# Patient Record
Sex: MALE | Race: BLACK OR AFRICAN AMERICAN | Employment: FULL TIME | URBAN - METROPOLITAN AREA
[De-identification: names, ages, dates, MRNs, and addresses within clinical notes are randomized per-mention and may not be internally consistent; named-entity substitution may affect disease eponyms.]

---

## 2020-08-03 VITALS
HEIGHT: 65 IN | TEMPERATURE: 97.8 F | SYSTOLIC BLOOD PRESSURE: 130 MMHG | DIASTOLIC BLOOD PRESSURE: 82 MMHG | BODY MASS INDEX: 29.82 KG/M2 | WEIGHT: 179 LBS

## 2020-08-03 PROBLEM — N40.1 ENLARGED PROSTATE WITH URINARY OBSTRUCTION: Status: ACTIVE | Noted: 2020-08-03

## 2020-08-03 PROBLEM — N49.1 VASITIS: Status: ACTIVE | Noted: 2020-08-03

## 2020-08-03 PROBLEM — R35.1 NOCTURIA: Status: ACTIVE | Noted: 2020-08-03

## 2020-08-03 PROBLEM — Z80.42 FAMILY HISTORY OF PROSTATE CANCER: Status: ACTIVE | Noted: 2020-08-03

## 2020-08-03 PROBLEM — R35.0 INCREASED URINARY FREQUENCY: Status: ACTIVE | Noted: 2020-08-03

## 2020-08-03 PROBLEM — N13.8 ENLARGED PROSTATE WITH URINARY OBSTRUCTION: Status: ACTIVE | Noted: 2020-08-03

## 2020-08-03 PROBLEM — N41.1 CHRONIC PROSTATITIS: Status: ACTIVE | Noted: 2020-08-03

## 2020-08-03 RX ORDER — SILDENAFIL 100 MG/1
100 TABLET, FILM COATED ORAL AS NEEDED
COMMUNITY

## 2020-08-03 RX ORDER — VALACYCLOVIR HYDROCHLORIDE 500 MG/1
TABLET, FILM COATED ORAL
COMMUNITY

## 2020-08-03 RX ORDER — HYDROCHLOROTHIAZIDE 25 MG/1
25 TABLET ORAL DAILY
COMMUNITY
End: 2021-01-05 | Stop reason: ALTCHOICE

## 2020-08-03 RX ORDER — ASPIRIN 81 MG/1
TABLET ORAL DAILY
COMMUNITY

## 2020-08-03 RX ORDER — TELMISARTAN 80 MG/1
80 TABLET ORAL DAILY
COMMUNITY

## 2020-08-03 RX ORDER — AMLODIPINE BESYLATE 2.5 MG/1
TABLET ORAL DAILY
COMMUNITY

## 2020-08-03 RX ORDER — ATORVASTATIN CALCIUM 10 MG/1
TABLET, FILM COATED ORAL DAILY
COMMUNITY

## 2020-08-03 RX ORDER — MELATONIN
DAILY
COMMUNITY

## 2020-08-03 RX ORDER — GLUCOSAM/CHONDRO/HERB 149/HYAL 750-100 MG
1 TABLET ORAL DAILY
COMMUNITY

## 2020-08-20 ENCOUNTER — OFFICE VISIT (OUTPATIENT)
Dept: UROLOGY | Age: 55
End: 2020-08-20
Payer: OTHER GOVERNMENT

## 2020-08-20 VITALS
TEMPERATURE: 97.9 F | DIASTOLIC BLOOD PRESSURE: 82 MMHG | BODY MASS INDEX: 30.49 KG/M2 | WEIGHT: 183 LBS | HEIGHT: 65 IN | SYSTOLIC BLOOD PRESSURE: 136 MMHG

## 2020-08-20 DIAGNOSIS — R35.1 NOCTURIA: ICD-10-CM

## 2020-08-20 DIAGNOSIS — N49.1 VASITIS: ICD-10-CM

## 2020-08-20 DIAGNOSIS — N13.8 ENLARGED PROSTATE WITH URINARY OBSTRUCTION: ICD-10-CM

## 2020-08-20 DIAGNOSIS — N41.1 CHRONIC PROSTATITIS: ICD-10-CM

## 2020-08-20 DIAGNOSIS — R35.0 INCREASED URINARY FREQUENCY: Primary | ICD-10-CM

## 2020-08-20 DIAGNOSIS — N40.1 ENLARGED PROSTATE WITH URINARY OBSTRUCTION: ICD-10-CM

## 2020-08-20 LAB
BILIRUB UR QL STRIP: NEGATIVE
GLUCOSE UR-MCNC: NEGATIVE MG/DL
KETONES P FAST UR STRIP-MCNC: NEGATIVE MG/DL
PH UR STRIP: 6 [PH] (ref 4.6–8)
PROT UR QL STRIP: NEGATIVE
SP GR UR STRIP: 1.02 (ref 1–1.03)
UA UROBILINOGEN AMB POC: NORMAL (ref 0.2–1)
URINALYSIS CLARITY POC: CLEAR
URINALYSIS COLOR POC: YELLOW
URINE BLOOD POC: NEGATIVE
URINE LEUKOCYTES POC: NEGATIVE
URINE NITRITES POC: NEGATIVE

## 2020-08-20 PROCEDURE — 99214 OFFICE O/P EST MOD 30 MIN: CPT | Performed by: UROLOGY

## 2020-08-20 PROCEDURE — 81003 URINALYSIS AUTO W/O SCOPE: CPT | Performed by: UROLOGY

## 2020-08-20 RX ORDER — SERTRALINE HYDROCHLORIDE 100 MG/1
TABLET, FILM COATED ORAL DAILY
COMMUNITY
End: 2021-02-24

## 2020-08-20 NOTE — PROGRESS NOTES
HPI ROS PE NOTE          History of Present Illness   Chief complaint: Follow-up for prostatitis, epididymitis and vasitis  Kim Plascencia is a 62 y.o. male who presents with his history of acute epididymitis and vasitis treated in July 2019 with doxycycline 100 mg twice daily for approximately 8 weeks. Patient was seen in follow-up in February of this year having resolved his symptoms, PSA test was done in December 2019 0.38. He has BPH with moderate bladder neck obstruction symptoms. International prostate symptom score a total of 4: nocturia once per night, see less than half the time, urgency less than 1 time in 5 voids. The patient's father had prostate cancer and therefore he is at higher risk for genetic transfer of this condition and needs to be carefully followed. Past Medical History:   Diagnosis Date    Burning with urination     Depression     Hypercholesterolemia     Hypertension     Long term current use of anticoagulant therapy     asa 81mg daily    Stroke [facility])       Past Surgical History:   Procedure Laterality Date    HX APPENDECTOMY       Family History   Problem Relation Age of Onset    Cancer Father         prostate      Social History     Tobacco Use    Smoking status: Never Smoker    Smokeless tobacco: Never Used   Substance Use Topics    Alcohol use: Never     Frequency: Never       Prior to Admission medications    Medication Sig Start Date End Date Taking? Authorizing Provider   sertraline (Zoloft) 100 mg tablet Take  by mouth daily. Yes Provider, Historical   amLODIPine (NORVASC) 2.5 mg tablet Take  by mouth daily. Yes Provider, Historical   aspirin delayed-release 81 mg tablet Take  by mouth daily. Yes Provider, Historical   atorvastatin (LIPITOR) 10 mg tablet Take  by mouth daily. Yes Provider, Historical   cholecalciferol (VITAMIN D3) (5000 Units/125 mcg) tab tablet Take  by mouth daily.    Yes Provider, Historical   omega 3-DHA-EPA-fish oil 1,000 mg (120 mg-180 mg) capsule Take 1 Cap by mouth daily. Yes Provider, Historical   telmisartan (MICARDIS) 80 mg tablet Take 80 mg by mouth daily. Yes Provider, Historical   valACYclovir (VALTREX) 500 mg tablet Take  by mouth two (2) times a day. Yes Provider, Historical   sildenafil citrate (Viagra) 100 mg tablet Take 100 mg by mouth as needed for Erectile Dysfunction. Yes Provider, Historical   hydroCHLOROthiazide (HYDRODIURIL) 25 mg tablet Take 25 mg by mouth daily. Provider, Historical     No Known Allergies     Review of Systems:  Constitutional: negative  Respiratory: negative  Cardiovascular: negative  Genitourinary:positive for frequency, nocturia and urgency  Musculoskeletal:positive for arthralgias     Physical Exam             Physical Exam:   Visit Vitals  /82   Temp 97.9 °F (36.6 °C) (Oral)   Ht 5' 5\" (1.651 m)   Wt 183 lb (83 kg)   BMI 30.45 kg/m²     General appearance: alert, cooperative, no distress, appears stated age  Head: Normocephalic, without obvious abnormality, atraumatic  Heart: regular rate and rhythm, S1, S2 normal, no murmur, click, rub or gallop  Abdomen: soft, non-tender.  Bowel sounds normal. No masses,  no organomegaly  Male genitalia: normal  Rectal: refused exam  Extremities: extremities normal, atraumatic, no cyanosis or edema        Data Review:   Recent Results (from the past 48 hour(s))   AMB POC URINALYSIS DIP STICK AUTO W/O MICRO    Collection Time: 08/20/20 10:01 AM   Result Value Ref Range    Color (UA POC) Yellow     Clarity (UA POC) Clear     Glucose (UA POC) Negative Negative    Bilirubin (UA POC) Negative Negative    Ketones (UA POC) Negative Negative    Specific gravity (UA POC) 1.025 1.001 - 1.035    Blood (UA POC) Negative Negative    pH (UA POC) 6.0 4.6 - 8.0    Protein (UA POC) Negative Negative    Urobilinogen (UA POC) 0.2 mg/dL 0.2 - 1    Nitrites (UA POC) Negative Negative    Leukocyte esterase (UA POC) Negative Negative     No results for input(s): 48 in the last 72 hours. Assessment: history of epididymitis and vasitis, treated and resolved, and Plan:   BPH, mild obstructive symptoms, currently no treatment required  Family history of prostate cancer at increased risk for developing this disease, must follow carefully annually with PSA testing        Mr. Edison Colby has a reminder for a \"due or due soon\" health maintenance. I have asked that he contact his primary care provider for follow-up on this health maintenance. Marti Lozano M.D.  8/20/2020

## 2020-08-22 LAB — BACTERIA UR CULT: NO GROWTH

## 2020-12-28 ENCOUNTER — TRANSCRIBE ORDER (OUTPATIENT)
Dept: REGISTRATION | Age: 55
End: 2020-12-28

## 2020-12-28 ENCOUNTER — HOSPITAL ENCOUNTER (OUTPATIENT)
Dept: GENERAL RADIOLOGY | Age: 55
Discharge: HOME | End: 2020-12-28
Payer: OTHER GOVERNMENT

## 2020-12-28 DIAGNOSIS — M25.511 RIGHT SHOULDER PAIN: ICD-10-CM

## 2020-12-28 DIAGNOSIS — M25.511 RIGHT SHOULDER PAIN: Primary | ICD-10-CM

## 2020-12-28 PROCEDURE — 73030 X-RAY EXAM OF SHOULDER: CPT

## 2021-01-04 ENCOUNTER — TELEPHONE (OUTPATIENT)
Dept: UROLOGY | Age: 56
End: 2021-01-04

## 2021-01-05 ENCOUNTER — OFFICE VISIT (OUTPATIENT)
Dept: UROLOGY | Age: 56
End: 2021-01-05
Payer: OTHER GOVERNMENT

## 2021-01-05 VITALS
HEIGHT: 65 IN | BODY MASS INDEX: 30.82 KG/M2 | TEMPERATURE: 97.5 F | WEIGHT: 185 LBS | DIASTOLIC BLOOD PRESSURE: 62 MMHG | SYSTOLIC BLOOD PRESSURE: 128 MMHG

## 2021-01-05 DIAGNOSIS — R35.1 NOCTURIA: Primary | ICD-10-CM

## 2021-01-05 DIAGNOSIS — N13.8 ENLARGED PROSTATE WITH URINARY OBSTRUCTION: ICD-10-CM

## 2021-01-05 DIAGNOSIS — N40.1 ENLARGED PROSTATE WITH URINARY OBSTRUCTION: ICD-10-CM

## 2021-01-05 DIAGNOSIS — N49.1 VASITIS: ICD-10-CM

## 2021-01-05 DIAGNOSIS — R35.0 INCREASED URINARY FREQUENCY: ICD-10-CM

## 2021-01-05 PROCEDURE — 81003 URINALYSIS AUTO W/O SCOPE: CPT | Performed by: UROLOGY

## 2021-01-05 PROCEDURE — 99214 OFFICE O/P EST MOD 30 MIN: CPT | Performed by: UROLOGY

## 2021-01-05 RX ORDER — DOXYCYCLINE 100 MG/1
100 CAPSULE ORAL
Qty: 60 CAP | Refills: 3 | Status: SHIPPED | OUTPATIENT
Start: 2021-01-05 | End: 2021-02-04

## 2021-01-05 NOTE — PROGRESS NOTES
HPI ROS PE NOTE          History of Present Illness   Chief complaint: Follow-up for prostatitis epididymitis and vasitis: Family history of prostate cancer  Jaleesa Zhong is a 60 y.o. male who presents with follow-up for epididymitis and prostatitis treated in the summer 2019 for approximately 8 weeks on doxycycline patient was seen earlier this year with resolution of the symptoms after long-term therapy. Also has a family history of prostate cancer making him more vulnerable for development of this disease. PSA in December 2019 was 0.38. November 2020 report from [facility] clinic PSA of 0.37. Patient has bladder neck obstruction of a moderate severity, has nocturia once a night international prostate symptom score a total score of 8 including in addition frequency and weak stream less than half the time, incomplete emptying intermittency and urgency on occasion. Pleased with his voiding pattern. Does not take any active medications for his obstruction. Patient has had recent increase in urinary frequency and nocturia, may have exacerbation of prostatitis  Past Medical History:   Diagnosis Date    Burning with urination     Depression     Hypercholesterolemia     Hypertension     Long term current use of anticoagulant therapy     asa 81mg daily    Stroke [facility])       Past Surgical History:   Procedure Laterality Date    HX APPENDECTOMY       Family History   Problem Relation Age of Onset    Cancer Father         prostate      Social History     Tobacco Use    Smoking status: Never Smoker    Smokeless tobacco: Never Used   Substance Use Topics    Alcohol use: Never     Frequency: Never       Prior to Admission medications    Medication Sig Start Date End Date Taking? Authorizing Provider   doxycycline (VIBRAMYCIN) 100 mg capsule Take 1 Cap by mouth two (2) times daily (after meals) for 30 days.  1/5/21 2/4/21 Yes Chao Baker MD   sertraline (Zoloft) 100 mg tablet Take  by mouth daily. Yes Provider, Historical   amLODIPine (NORVASC) 2.5 mg tablet Take  by mouth daily. Yes Provider, Historical   aspirin delayed-release 81 mg tablet Take  by mouth daily. Yes Provider, Historical   atorvastatin (LIPITOR) 10 mg tablet Take  by mouth daily. Yes Provider, Historical   cholecalciferol (VITAMIN D3) (5000 Units/125 mcg) tab tablet Take  by mouth daily. Yes Provider, Historical   omega 3-DHA-EPA-fish oil 1,000 mg (120 mg-180 mg) capsule Take 1 Cap by mouth daily. Yes Provider, Historical   telmisartan (MICARDIS) 80 mg tablet Take 80 mg by mouth daily. Yes Provider, Historical   valACYclovir (VALTREX) 500 mg tablet Take  by mouth two (2) times a day. Yes Provider, Historical   sildenafil citrate (Viagra) 100 mg tablet Take 100 mg by mouth as needed for Erectile Dysfunction. Yes Provider, Historical     No Known Allergies     Review of Systems:  Constitutional: negative  Respiratory: negative  Cardiovascular: negative  Gastrointestinal: positive for constipation  Genitourinary:positive for frequency, nocturia, decreased stream and Urgency, urgency, incomplete bladder emptying  Musculoskeletal:negative  Neurological: negative     Physical Exam     Physical Exam:   Visit Vitals  /62 (BP 1 Location: Left arm, BP Patient Position: Sitting)   Temp 97.5 °F (36.4 °C) (Temporal)   Ht 5' 5\" (1.651 m)   Wt 185 lb (83.9 kg)   BMI 30.79 kg/m²     General appearance: alert, cooperative, no distress, appears stated age  Head: Normocephalic, without obvious abnormality, atraumatic  Lungs: clear to auscultation bilaterally  Chest wall: no tenderness  Heart: regular rate and rhythm, S1, S2 normal, no murmur, click, rub or gallop  Abdomen: soft, non-tender.  Bowel sounds normal. No masses,  no organomegaly  Male genitalia: normal  Rectal: Prostate 30 g benign  Extremities: extremities normal, atraumatic, no cyanosis or edema  Skin: Skin color, texture, turgor normal. No rashes or lesions  Neurologic: Grossly normal    Data Review:   Recent Results (from the past 48 hour(s))   AMB POC URINALYSIS DIP STICK AUTO W/O MICRO    Collection Time: 01/05/21  9:09 AM   Result Value Ref Range    Color (UA POC) Yellow     Clarity (UA POC) Clear     Glucose (UA POC) Negative Negative    Bilirubin (UA POC) Negative Negative    Ketones (UA POC) Negative Negative    Specific gravity (UA POC) 1.025 1.001 - 1.035    Blood (UA POC) Negative Negative    pH (UA POC) 6.0 4.6 - 8.0    Protein (UA POC) Negative Negative    Urobilinogen (UA POC) 1 mg/dL 0.2 - 1    Nitrites (UA POC) Negative Negative    Leukocyte esterase (UA POC) Negative Negative     No results for input(s): 48 in the last 72 hours. Assessment and Plan:    epididymitis, and vasitis, earlier present, treated, resolved; an increase in frequency and nocturia, possible exacerbation of prostatitis, restart doxycycline 100 mg twice daily for 1 month with follow-up in 6 weeks. Bladder neck obstruction secondary to BPH, nocturia, frequency, weak urinary stream, incomplete emptying, intermittency, urgency, all of mild to moderate extent and therefore patient does not require treatment for these at the present time,  Family history of prostate cancer, must continue to carefully follow with PSA studies once yearly. Return visit in 1 year sooner as needed with PSA before visit      Mr. Anastasiia Flores has a reminder for a \"due or due soon\" health maintenance. I have asked that he contact his primary care provider for follow-up on this health maintenance. Modesta Becerra M.D.  1/5/2021

## 2021-02-12 ENCOUNTER — TELEPHONE (OUTPATIENT)
Dept: UROLOGY | Age: 56
End: 2021-02-12

## 2021-02-15 ENCOUNTER — TELEPHONE (OUTPATIENT)
Dept: UROLOGY | Age: 56
End: 2021-02-15

## 2021-02-15 NOTE — TELEPHONE ENCOUNTER
Called pt to remind him of his appt tomorrow 02/16. Patient mentioned speaking with nurse in regards to his PSA. He said he is able to get a copy and bring it with him for his appt tomorrow. Patient made aware that he had physically given PSA results to one of the nurses 2 weeks ago approx. . I am unsure of what happened to that.

## 2021-02-16 ENCOUNTER — OFFICE VISIT (OUTPATIENT)
Dept: UROLOGY | Age: 56
End: 2021-02-16
Payer: OTHER GOVERNMENT

## 2021-02-16 VITALS
HEIGHT: 65 IN | TEMPERATURE: 97.6 F | HEART RATE: 69 BPM | BODY MASS INDEX: 29.82 KG/M2 | DIASTOLIC BLOOD PRESSURE: 90 MMHG | SYSTOLIC BLOOD PRESSURE: 135 MMHG | WEIGHT: 179 LBS

## 2021-02-16 DIAGNOSIS — Z80.42 FAMILY HX OF PROSTATE CANCER: Primary | ICD-10-CM

## 2021-02-16 DIAGNOSIS — N40.1 ENLARGED PROSTATE WITH URINARY OBSTRUCTION: ICD-10-CM

## 2021-02-16 DIAGNOSIS — R35.0 INCREASED URINARY FREQUENCY: ICD-10-CM

## 2021-02-16 DIAGNOSIS — N13.8 ENLARGED PROSTATE WITH URINARY OBSTRUCTION: ICD-10-CM

## 2021-02-16 LAB
BILIRUB UR QL STRIP: NEGATIVE
GLUCOSE UR-MCNC: NEGATIVE MG/DL
KETONES P FAST UR STRIP-MCNC: NEGATIVE MG/DL
PH UR STRIP: 7.5 [PH] (ref 4.6–8)
PROT UR QL STRIP: NORMAL
SP GR UR STRIP: 1.02 (ref 1–1.03)
UA UROBILINOGEN AMB POC: NORMAL (ref 0.2–1)
URINALYSIS CLARITY POC: CLEAR
URINALYSIS COLOR POC: YELLOW
URINE BLOOD POC: NEGATIVE
URINE LEUKOCYTES POC: NORMAL
URINE NITRITES POC: NEGATIVE

## 2021-02-16 PROCEDURE — 81003 URINALYSIS AUTO W/O SCOPE: CPT | Performed by: UROLOGY

## 2021-02-16 PROCEDURE — 99213 OFFICE O/P EST LOW 20 MIN: CPT | Performed by: UROLOGY

## 2021-02-16 NOTE — PROGRESS NOTES
HPI ROS PE NOTE          History of Present Illness   Chief complaint: Follow-up for prostatitis epididymitis and family history of prostate cancer  Thuy Helm is a 60 y.o. male who presents with follow-up visit from January 5, 2021, treated with doxycycline 100 mg twice daily, increased frequency thought to be representative of recurrent prostatitis. The background of this situation is at an earlier visit in the summer 2019 when the patient had prostatitis epididymitis and vasitis and was successfully treated with doxycycline at that time. His international prostate symptom score at the time of his last visit was a total of 8 whereas today's interview which I conducted showed an international prostate symptom score of 4 reflecting frequency about half the time and nocturia once per night but otherwise no complaints. The patient said that he has had a recent PSA test but the only one in the record that we have which was just printed yesterday was a result from December 2019. My previous note indicates that in November 2020 PSA value was 0.37 although I do not have an independent laboratory report. .. Past Medical History:   Diagnosis Date    Burning with urination     Depression     Hypercholesterolemia     Hypertension     Long term current use of anticoagulant therapy     asa 81mg daily    Stroke [facility])       Past Surgical History:   Procedure Laterality Date    HX APPENDECTOMY       Family History   Problem Relation Age of Onset    Cancer Father         prostate      Social History     Tobacco Use    Smoking status: Never Smoker    Smokeless tobacco: Never Used   Substance Use Topics    Alcohol use: Never     Frequency: Never       Prior to Admission medications    Medication Sig Start Date End Date Taking? Authorizing Provider   sertraline (Zoloft) 100 mg tablet Take  by mouth daily. Yes Provider, Historical   amLODIPine (NORVASC) 2.5 mg tablet Take  by mouth daily.    Yes Provider, Historical   aspirin delayed-release 81 mg tablet Take  by mouth daily. Yes Provider, Historical   atorvastatin (LIPITOR) 10 mg tablet Take  by mouth daily. Yes Provider, Historical   cholecalciferol (VITAMIN D3) (5000 Units/125 mcg) tab tablet Take  by mouth daily. Yes Provider, Historical   omega 3-DHA-EPA-fish oil 1,000 mg (120 mg-180 mg) capsule Take 1 Cap by mouth daily. Yes Provider, Historical   telmisartan (MICARDIS) 80 mg tablet Take 80 mg by mouth daily. Yes Provider, Historical   valACYclovir (VALTREX) 500 mg tablet Take  by mouth two (2) times a day. Yes Provider, Historical   sildenafil citrate (Viagra) 100 mg tablet Take 100 mg by mouth as needed for Erectile Dysfunction. Yes Provider, Historical     No Known Allergies     Review of Systems:  A comprehensive review of systems was negative except for that written in the History of Present Illness. Physical Exam     Physical Exam:   Visit Vitals  BP (!) 135/90 (BP 1 Location: Left arm, BP Patient Position: Sitting, BP Cuff Size: Adult long)   Pulse 69   Temp 97.6 °F (36.4 °C) (Temporal)   Ht 5' 5\" (1.651 m)   Wt 179 lb (81.2 kg)   BMI 29.79 kg/m²     General appearance: alert, cooperative, no distress, appears stated age  Head: Normocephalic, without obvious abnormality, atraumatic  Nose: Nares normal. Septum midline. Mucosa normal. No drainage or sinus tenderness. Back: symmetric, no curvature. ROM normal. No CVA tenderness. Lungs: clear to auscultation bilaterally  Chest wall: no tenderness  Heart: regular rate and rhythm, S1, S2 normal, no murmur, click, rub or gallop  Abdomen: soft, non-tender.  Bowel sounds normal. No masses,  no organomegaly  Male genitalia: normal  Rectal: Prostate 30 g and benign  Extremities: extremities normal, atraumatic, no cyanosis or edema  Pulses: 2+ and symmetric  Skin: Skin color, texture, turgor normal. No rashes or lesions    Data Review:   Recent Results (from the past 48 hour(s))   AMB POC URINALYSIS DIP STICK AUTO W/O MICRO    Collection Time: 02/16/21 11:17 AM   Result Value Ref Range    Color (UA POC) Yellow     Clarity (UA POC) Clear     Glucose (UA POC) Negative Negative    Bilirubin (UA POC) Negative Negative    Ketones (UA POC) Negative Negative    Specific gravity (UA POC) 1.025 1.001 - 1.035    Blood (UA POC) Negative Negative    pH (UA POC) 7.5 4.6 - 8.0    Protein (UA POC) 1+ Negative    Urobilinogen (UA POC) 0.2 mg/dL 0.2 - 1    Nitrites (UA POC) Negative Negative    Leukocyte esterase (UA POC) Trace Negative     No results for input(s): 48 in the last 72 hours. Assessment and Plan:   History of epididymitis and vasitis, resolved  Prostatitis with associated BPH, effective treatment with doxycycline 100 mg twice daily, recommend to continue this medication at 100 mg daily reduced from twice daily  Family history of prostate cancer continue to carefully follow with PSA, next visit 6 months with PSA before visit  Proteinuria, continue to follow    Mr. Dhaval Chan has a reminder for a \"due or due soon\" health maintenance. I have asked that he contact his primary care provider for follow-up on this health maintenance. Marco Antonio Daugherty M.D.  2/16/2021

## 2021-02-18 LAB — BACTERIA UR CULT: NO GROWTH

## 2021-02-24 ENCOUNTER — HOSPITAL ENCOUNTER (OUTPATIENT)
Dept: PREADMISSION TESTING | Age: 56
Discharge: HOME | End: 2021-02-24
Payer: OTHER GOVERNMENT

## 2021-02-24 VITALS
HEIGHT: 65 IN | BODY MASS INDEX: 30.96 KG/M2 | SYSTOLIC BLOOD PRESSURE: 150 MMHG | DIASTOLIC BLOOD PRESSURE: 84 MMHG | TEMPERATURE: 98.7 F | OXYGEN SATURATION: 99 % | WEIGHT: 185.85 LBS | RESPIRATION RATE: 14 BRPM | HEART RATE: 93 BPM

## 2021-02-24 LAB
ANION GAP SERPL CALC-SCNC: 2 MMOL/L (ref 5–15)
ATRIAL RATE: 67 BPM
BUN SERPL-MCNC: 17 MG/DL (ref 6–20)
BUN/CREAT SERPL: 14 (ref 12–20)
CALCIUM SERPL-MCNC: 9.5 MG/DL (ref 8.5–10.1)
CALCULATED P AXIS, ECG09: 62 DEGREES
CALCULATED R AXIS, ECG10: -17 DEGREES
CALCULATED T AXIS, ECG11: 31 DEGREES
CHLORIDE SERPL-SCNC: 108 MMOL/L (ref 97–108)
CO2 SERPL-SCNC: 32 MMOL/L (ref 21–32)
CREAT SERPL-MCNC: 1.25 MG/DL (ref 0.7–1.3)
DIAGNOSIS, 93000: NORMAL
GLUCOSE SERPL-MCNC: 86 MG/DL (ref 65–100)
P-R INTERVAL, ECG05: 168 MS
POTASSIUM SERPL-SCNC: 4.6 MMOL/L (ref 3.5–5.1)
Q-T INTERVAL, ECG07: 366 MS
QRS DURATION, ECG06: 100 MS
QTC CALCULATION (BEZET), ECG08: 386 MS
SODIUM SERPL-SCNC: 142 MMOL/L (ref 136–145)
VENTRICULAR RATE, ECG03: 67 BPM

## 2021-02-24 PROCEDURE — 93005 ELECTROCARDIOGRAM TRACING: CPT

## 2021-02-24 PROCEDURE — 36415 COLL VENOUS BLD VENIPUNCTURE: CPT

## 2021-02-24 PROCEDURE — 80048 BASIC METABOLIC PNL TOTAL CA: CPT

## 2021-02-24 RX ORDER — TRAZODONE HYDROCHLORIDE 50 MG/1
TABLET ORAL
COMMUNITY

## 2021-02-24 RX ORDER — BUPROPION HYDROCHLORIDE 150 MG/1
150 TABLET ORAL
COMMUNITY

## 2021-02-24 RX ORDER — PRAZOSIN HYDROCHLORIDE 5 MG/1
CAPSULE ORAL
COMMUNITY

## 2021-02-24 NOTE — PERIOP NOTES
It is now mandated that all surgical patients be tested for COVID-19 exactly 4 days prior to surgery. Your COVID test date is Monday, 3/8/21, 8am-11am.      You must bring a photo ID. COVID testing will be performed curbside in the [address] [facility] [address]. There are signs leading you to the testing site. Please self-quarantine at home after testing and prior to your surgery date. You will only be notified if your results are positive. For Your Information:    · Coronavirus (COVID-19) affects different people in different ways  · It also appears with a wide range of symptoms from mild to severe  · Signs appear 2-14 days after exposure     · If you develop any of the following, notify your doctor immediately:  ? Fever  ? Chills, with or without a shiver  ? Muscle pain  ? Headache  ? Sore throat  ? Dry cough  ? New loss of taste or smell  ? Tiredness     ·  If you develop any of the following, call 911:  ? Shortness of breath  ? Difficulty breathing  ? Chest pain  ? New confusion  ?  Blueness of fingers and/or lips

## 2021-02-24 NOTE — H&P
History and Physical    Patient: Chris Sesay MRN: [id number]  SSN: [id number]    YOB: 1965  Age: 60 y.o. Sex: male      Subjective:      Chris Sesay is a 60 y.o. male who notes he has chronic right shoulder pain. He is retired  and has been hurting for over 10 years. He is an instructor now at [employer] and notes even lifting his arm to use his clicker is painful. Denies numbness/tingling. He is RHD. He had a previous CVA with unknown timeline. 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 He notes the CT of his head showed a possible previous stroke. He was placed on ASA at the time. Past Medical History:   Diagnosis Date    BPH (benign prostatic hyperplasia)     Burning with urination     Depression     Frequent urination     H/O Bell's palsy 2017    Hypercholesterolemia     Hypertension     EGNEVA on CPAP     Prostatitis 01/05/2021    PTSD (post-traumatic stress disorder)     Stroke ([facility] [address] Unknown    Takes ASA daily    Tinnitus of right ear      Past Surgical History:   Procedure Laterality Date    HX APPENDECTOMY      HX COLONOSCOPY        Family History   Problem Relation Age of Onset    Cancer Father         prostate    Anesth Problems Neg Hx     Clotting Disorder Neg Hx      Social History     Tobacco Use    Smoking status: Never Smoker    Smokeless tobacco: Never Used   Substance Use Topics    Alcohol use: Never     Frequency: Never      Prior to Admission medications    Medication Sig Start Date End Date Taking? Authorizing Provider   calcium carbonate/vitamin D3 (CALCIUM 500 + D, D3, PO) Take 1 Tab by mouth daily. Yes Provider, Historical   traZODone (DESYREL) 50 mg tablet Take  by mouth nightly. Yes Provider, Historical   prazosin (MINIPRESS) 5 mg capsule Take  by mouth nightly. Yes Provider, Historical   buPROPion XL (WELLBUTRIN XL) 150 mg tablet Take 150 mg by mouth nightly.    Yes Provider, Historical   OTHER Will have 2nd Covid Vax 2/26/2021   Yes Provider, Historical   amLODIPine (NORVASC) 2.5 mg tablet Take  by mouth daily. Yes Provider, Historical   aspirin delayed-release 81 mg tablet Take  by mouth daily. Yes Provider, Historical   atorvastatin (LIPITOR) 10 mg tablet Take  by mouth daily. Yes Provider, Historical   cholecalciferol (VITAMIN D3) (1000 Units /25 mcg) tablet Take  by mouth daily. Yes Provider, Historical   omega 3-DHA-EPA-fish oil 1,000 mg (120 mg-180 mg) capsule Take 1 Cap by mouth daily. Yes Provider, Historical   telmisartan (MICARDIS) 80 mg tablet Take 80 mg by mouth daily. Yes Provider, Historical   valACYclovir (VALTREX) 500 mg tablet Take  by mouth two (2) times daily as needed. Provider, Historical   sildenafil citrate (Viagra) 100 mg tablet Take 100 mg by mouth as needed for Erectile Dysfunction. Provider, Historical        No Known Allergies    Review of Systems:  Constitutional: Negative for chills and fever  HENT: Negative for congestion and sore throat  Eyes: negative for blurred vision and double vision  Respiratory: Negative for cough, shortness of breath and wheezing  Mouth: Negative for loose, broken or chipped teeth. Cardiovascular: Negative for chest pain and palpitations  Gastrointestinal: Negative for abdominal pain, constipation, diarrhea and nausea  Genitourinary: Negative for dysuria and hematuria  Musculoskeletal: Right shoulder pain  Skin: Negative for rash, open wounds. Negative for bruises easily  Neurological: Negative for dizziness, tremors and headaches  Psychiatric: Negative for depression. The patient is not nervous/anxious.     Objective:     Vitals:    02/24/21 0858   BP: (!) 150/84   Pulse: 93   Resp: 14   Temp: 98.7 °F (37.1 °C)   SpO2: 99%   Weight: 84.3 kg (185 lb 13.6 oz)   Height: 5' 5\" (1.651 m)        Physical Exam:  Constitutional:  Appears well,  No Acute Distress, Vitals noted  Psychiatric:   Affect normal, Alert and Oriented to person/place/time    Eyes:   Pupils equally round and reactive, EOMI, conjunctiva clear, eyelids normal  ENT:   External ears and nose normal, teeth normal, gums normal, TMs and Orophyarynx normal  Neck:   General inspection and Thyroid normal.  No abnormal cervical or supraclavicular nodes    Lungs:   Clear to auscultation, good respiratory effort  Heart: Ausculation normal.  Regular rhythm. No cardiac murmurs. No carotid bruits or palpable thrills  Chest wall normal  Musculoskeletal: Limited ROM to shoulder  Extremities:   Without edema, good peripheral pulses  Skin:   Warm to palpation, without rashes, bruising, or suspicious lesions     Recent Results (from the past 72 hour(s))   METABOLIC PANEL, BASIC    Collection Time: 02/24/21  9:59 AM   Result Value Ref Range    Sodium 142 136 - 145 mmol/L    Potassium 4.6 3.5 - 5.1 mmol/L    Chloride 108 97 - 108 mmol/L    CO2 32 21 - 32 mmol/L    Anion gap 2 (L) 5 - 15 mmol/L    Glucose 86 65 - 100 mg/dL    BUN 17 6 - 20 MG/DL    Creatinine 1.25 0.70 - 1.30 MG/DL    BUN/Creatinine ratio 14 12 - 20      GFR est AA >60 >60 ml/min/1.73m2    GFR est non-AA 60 (L) >60 ml/min/1.73m2    Calcium 9.5 8.5 - 10.1 MG/DL   EKG, 12 LEAD, INITIAL    Collection Time: 02/24/21 10:21 AM   Result Value Ref Range    Ventricular Rate 67 BPM    Atrial Rate 67 BPM    P-R Interval 168 ms    QRS Duration 100 ms    Q-T Interval 366 ms    QTC Calculation (Bezet) 386 ms    Calculated P Axis 62 degrees    Calculated R Axis -17 degrees    Calculated T Axis 31 degrees    Diagnosis       Normal sinus rhythm  Nonspecific T wave abnormality  Abnormal ECG  No previous ECGs available  Confirmed by Mikie Johnson M.D., Paulino Bennett ([id number]) on 2/24/2021 3:39:23 PM           Assessment:      Torn Right RC    Plan:     Right RC Repair  Labs and EKG reviewed    Signed By: Vlad Chavarria NP     February 25, 2021

## 2021-02-24 NOTE — PERIOP NOTES
Fax not going through after 3 attempts.  Called pt for correct physician phone #.  [phone]:  Fax conf rec'd

## 2021-02-24 NOTE — PERIOP NOTES
[address] , [address] [facility]  PRE-ADMISSION TESTING    [phone]     Surgery Date:   Friday, 3/12/21      [facility] staff will call you between 3 and 7pm the day before your surgery with your arrival time. Call [phone] after 7pm Thursday if you did not receive this call. INSTRUCTIONS BEFORE YOUR SURGERY   When You  Arrive Please proceed to the 2nd Floor Admitting Desk on the day of your surgery  Have your insurance card, photo ID, and any copayment (if needed)   Food   and   Drink NO food or drink after midnight the night before surgery    This means NO water, gum, mints, coffee, juice, etc.  No alcohol (beer, wine, liquor) 24 hours before or after surgery   Medications to   TAKE   Morning of Surgery    Amlodipine & atorvastatin   Tylenol/acetaminophen products may be taken for pain, if needed   Medications  To  STOP  Friday  3/5/21  Non-Steroidal Anti-Inflammatory Drugs (NSAIDs): Ibuprofen (Advil, Motrin), Naproxen (Aleve)   Aspirin containing products for pain    Herbal supplements, vitamins, and fish oil     Special Medication Instruction We will contact Akosua Hernandez for aspirin instructions & then call you    Do not take your telmisartan the morning of surgery   Bathing Clothing  Jewelry  Valuables      If you shower the morning of surgery, please do not apply anything to your skin (lotions, powders, deodorant).  Follow Chlorhexidine Care Fusion body wash instructions provided to you during PAT appointment. Begin 3 days prior to surgery.  Do not shave or trim any body part 24 hours before surgery.  Leave money, valuables, and jewelry, including body piercings, at home.  Wear loose, comfortable clothes. Going Home You must have a responsible adult drive you home and stay with you 24 hours after surgery. Special Instructions If a situation occurs and you are delayed the day of surgery, call [phone].     If your physical condition changes (like a fever, cold, flu, etc.) call your surgeon. Home medication(s) reviewed and verified with patient's written list during PAT appointment. The patient was contacted  in person. The patient verbalizes understanding of all instructions and does not  need reinforcement.

## 2021-03-08 ENCOUNTER — HOSPITAL ENCOUNTER (OUTPATIENT)
Dept: PREADMISSION TESTING | Age: 56
Discharge: HOME | End: 2021-03-08
Payer: OTHER GOVERNMENT

## 2021-03-08 PROCEDURE — U0003 INFECTIOUS AGENT DETECTION BY NUCLEIC ACID (DNA OR RNA); SEVERE ACUTE RESPIRATORY SYNDROME CORONAVIRUS 2 (SARS-COV-2) (CORONAVIRUS DISEASE [COVID-19]), AMPLIFIED PROBE TECHNIQUE, MAKING USE OF HIGH THROUGHPUT TECHNOLOGIES AS DESCRIBED BY CMS-2020-01-R: HCPCS

## 2021-03-09 LAB — SARS-COV-2, COV2NT: NOT DETECTED

## 2021-08-16 DIAGNOSIS — Z80.42 FAMILY HX OF PROSTATE CANCER: ICD-10-CM

## 2021-10-20 ENCOUNTER — TELEPHONE (OUTPATIENT)
Dept: UROLOGY | Age: 56
End: 2021-10-20

## 2021-10-20 NOTE — TELEPHONE ENCOUNTER
Called to pt to get PSA drawn prior to appointment on the 27th of October pt didn't answer so I left a voicemail

## 2021-10-27 ENCOUNTER — OFFICE VISIT (OUTPATIENT)
Dept: UROLOGY | Age: 56
End: 2021-10-27
Payer: OTHER GOVERNMENT

## 2021-10-27 VITALS
TEMPERATURE: 95.9 F | WEIGHT: 192 LBS | OXYGEN SATURATION: 98 % | RESPIRATION RATE: 12 BRPM | HEIGHT: 65 IN | DIASTOLIC BLOOD PRESSURE: 84 MMHG | HEART RATE: 66 BPM | SYSTOLIC BLOOD PRESSURE: 136 MMHG | BODY MASS INDEX: 31.99 KG/M2

## 2021-10-27 DIAGNOSIS — N13.8 ENLARGED PROSTATE WITH URINARY OBSTRUCTION: ICD-10-CM

## 2021-10-27 DIAGNOSIS — N40.1 ENLARGED PROSTATE WITH URINARY OBSTRUCTION: ICD-10-CM

## 2021-10-27 DIAGNOSIS — N41.1 CHRONIC PROSTATITIS: ICD-10-CM

## 2021-10-27 DIAGNOSIS — Z80.42 FAMILY HISTORY OF PROSTATE CANCER: Primary | ICD-10-CM

## 2021-10-27 PROBLEM — H51.9 DISORDER OF EYE MOVEMENTS: Status: ACTIVE | Noted: 2021-10-27

## 2021-10-27 PROBLEM — H11.009 PTERYGIUM OF EYE: Status: ACTIVE | Noted: 2021-10-27

## 2021-10-27 PROBLEM — E78.5 DYSLIPIDEMIA: Status: ACTIVE | Noted: 2021-10-27

## 2021-10-27 PROBLEM — J30.9 ALLERGIC RHINITIS: Status: ACTIVE | Noted: 2021-10-27

## 2021-10-27 PROBLEM — R68.82 REDUCED LIBIDO: Status: ACTIVE | Noted: 2021-10-27

## 2021-10-27 PROBLEM — E78.6 FAMILIAL HYPOALPHALIPOPROTEINEMIA: Status: ACTIVE | Noted: 2021-10-27

## 2021-10-27 PROBLEM — G47.30 SLEEP APNEA: Status: ACTIVE | Noted: 2021-10-27

## 2021-10-27 PROBLEM — E78.5 HYPERLIPIDEMIA, UNSPECIFIED: Status: ACTIVE | Noted: 2017-01-11

## 2021-10-27 PROBLEM — M79.646 PAIN OF FINGER: Status: ACTIVE | Noted: 2021-10-27

## 2021-10-27 PROBLEM — S29.012A STRAIN OF THORACIC BACK REGION: Status: ACTIVE | Noted: 2021-10-27

## 2021-10-27 PROBLEM — M27.2 INFLAMMATORY CONDITION OF JAW: Status: ACTIVE | Noted: 2021-10-27

## 2021-10-27 PROBLEM — I10 HYPERTENSION: Status: ACTIVE | Noted: 2021-10-27

## 2021-10-27 PROBLEM — R80.9 PROTEINURIA: Status: ACTIVE | Noted: 2021-10-27

## 2021-10-27 PROBLEM — M25.519 PAIN IN UNSPECIFIED SHOULDER: Status: ACTIVE | Noted: 2021-10-27

## 2021-10-27 PROBLEM — I10 ESSENTIAL (PRIMARY) HYPERTENSION: Status: ACTIVE | Noted: 2017-01-11

## 2021-10-27 PROBLEM — E66.3 OVERWEIGHT: Status: ACTIVE | Noted: 2021-10-27

## 2021-10-27 PROBLEM — M25.569 PAIN IN JOINT, LOWER LEG: Status: ACTIVE | Noted: 2021-10-27

## 2021-10-27 PROBLEM — F52.21 MALE ERECTILE DISORDER (CODE): Status: ACTIVE | Noted: 2017-01-11

## 2021-10-27 PROBLEM — R14.0 BLOATING: Status: ACTIVE | Noted: 2021-10-27

## 2021-10-27 PROBLEM — L73.0 ACNE KELOIDALIS: Status: ACTIVE | Noted: 2021-10-27

## 2021-10-27 PROBLEM — I51.89 COMBINED SYSTOLIC AND DIASTOLIC CARDIAC DYSFUNCTION: Status: ACTIVE | Noted: 2021-10-27

## 2021-10-27 PROBLEM — M77.10 LATERAL EPICONDYLITIS (TENNIS ELBOW): Status: ACTIVE | Noted: 2021-10-27

## 2021-10-27 PROBLEM — L73.9 FOLLICULITIS: Status: ACTIVE | Noted: 2021-10-27

## 2021-10-27 LAB
BILIRUB UR QL STRIP: NEGATIVE
GLUCOSE UR-MCNC: NEGATIVE MG/DL
KETONES P FAST UR STRIP-MCNC: NEGATIVE MG/DL
PH UR STRIP: 7 [PH] (ref 4.6–8)
PROT UR QL STRIP: NEGATIVE
PVR POC: NORMAL CC
SP GR UR STRIP: 1.02 (ref 1–1.03)
UA UROBILINOGEN AMB POC: NORMAL (ref 0.2–1)
URINALYSIS CLARITY POC: CLEAR
URINALYSIS COLOR POC: YELLOW
URINE BLOOD POC: NEGATIVE
URINE LEUKOCYTES POC: NEGATIVE
URINE NITRITES POC: NEGATIVE

## 2021-10-27 PROCEDURE — 51798 US URINE CAPACITY MEASURE: CPT | Performed by: UROLOGY

## 2021-10-27 PROCEDURE — 81003 URINALYSIS AUTO W/O SCOPE: CPT | Performed by: UROLOGY

## 2021-10-27 PROCEDURE — 99215 OFFICE O/P EST HI 40 MIN: CPT | Performed by: UROLOGY

## 2021-10-27 RX ORDER — OMEGA-3 FATTY ACIDS/FISH OIL 340-1000MG
CAPSULE ORAL
COMMUNITY
Start: 2021-10-21 | End: 2021-10-27 | Stop reason: SDUPTHER

## 2021-10-27 RX ORDER — ONDANSETRON HYDROCHLORIDE 8 MG/1
TABLET, FILM COATED ORAL
COMMUNITY
Start: 2021-03-09 | End: 2022-01-05 | Stop reason: ALTCHOICE

## 2021-10-27 RX ORDER — DOXYCYCLINE HYCLATE 100 MG
100 TABLET ORAL
COMMUNITY
Start: 2021-01-05 | End: 2021-10-27 | Stop reason: ALTCHOICE

## 2021-10-27 RX ORDER — MULTIVITAMIN
TABLET ORAL
COMMUNITY
Start: 2021-09-24 | End: 2021-10-27 | Stop reason: SDUPTHER

## 2021-10-27 RX ORDER — OXYCODONE HYDROCHLORIDE 5 MG/1
TABLET ORAL
COMMUNITY
Start: 2021-03-09 | End: 2021-10-27 | Stop reason: ALTCHOICE

## 2021-10-27 NOTE — PROGRESS NOTES
Chief Complaint   Patient presents with    New Patient     [facility] - Hx prostate cancer/ROS and IPSS Forms    Prostatitis    Epididymitis         1. Have you been to the ER, urgent care clinic since your last visit? Hospitalized since your last visit? No    2. Have you seen or consulted any other health care providers outside of the [address] [place] [address] since your last visit? Include any pap smears or colon screening.  No      Visit Vitals  /84 (BP 1 Location: Left upper arm, BP Patient Position: Sitting, BP Cuff Size: Adult)   Pulse 66   Temp (!) 95.9 °F (35.5 °C) (Temporal)   Resp 12   Ht 5' 5\" (1.651 m)   Wt 192 lb (87.1 kg)   SpO2 98%   BMI 31.95 kg/m²

## 2021-10-27 NOTE — PROGRESS NOTES
HISTORY OF PRESENT ILLNESS  Chris Hurt is a 60 y.o. male. Chief Complaint   Patient presents with    New Patient     [facility] - Hx prostate cancer/ROS and IPSS Forms    Prostatitis    Epididymitis   Patient comes in with a history of urgency and frequency. Talking the patient he voids about every hour he goes up a 15-20 times a day more he says. He has had this condition for more than 10 years he keeps getting told his prostatitis but it never goes away. He has a family member has a same situation, his brother as well as his mother. I gave him a puff questionnaire and he came up to about a 10 but it sounds more like interstitial cystitis especially with a familial background. He denies fevers, chills, nausea, vomiting weight loss or bone pain. He is going to try the new beta 1 agonist just came out. He is going to change his diet, I gave him literature on interstitial cystitis and I gave him ideas were to get preleaf. He wants to pursue treatment is driving him crazy           The patient is a 61 yo male is here for a follow appointment. He reports voiding 10 times per day ( has to go every 2 hours) Reports being  dx with overactive bladder about 3 years ago. His IPSS score is 5. Chronic Conditions Addressed Today     1. Chronic prostatitis     Overview      Patient of Dr. Kimberlyn Greenfield with history of prostatitis and  Epidymidis successfully treated with doxycycline. 2. Family history of prostate cancer - Primary    3. Enlarged prostate with urinary obstruction     Overview      PSA[de-identified]  10/2020=0.37  2/2021=0.38               Patient denies the symptoms of COVID-19 per routine screening guidelines. Review of Systems   Constitutional: Negative. HENT: Negative. Respiratory: Negative. Cardiovascular: Negative. HTN   Gastrointestinal: Negative. Genitourinary: Positive for frequency. Musculoskeletal: Negative. Skin: Negative. Neurological: Negative. Endo/Heme/Allergies: Negative. Psychiatric/Behavioral: Negative. Past Medical History:  PMHx (including negatives):  has a past medical history of BPH (benign prostatic hyperplasia), Burning with urination, Depression, Frequent urination, H/O Bell's palsy (2017), Hypercholesterolemia, Hypertension, GENEVA on CPAP, Prostatitis (01/05/2021), PTSD (post-traumatic stress disorder), Stroke ([facility] [address] (Unknown), and Tinnitus of right ear. PSurgHx:  has a past surgical history that includes hx appendectomy and hx colonoscopy. PSocHx:  reports that he has never smoked. He has never used smokeless tobacco. He reports that he does not drink alcohol and does not use drugs. Home Medications    Medication Sig Start Date End Date Taking? Authorizing Provider   ondansetron hcl (ZOFRAN) 8 mg tablet  3/9/21  Yes Provider, Historical   calcium carbonate/vitamin D3 (CALCIUM 500 + D, D3, PO) Take 1 Tab by mouth daily. Yes Provider, Historical   traZODone (DESYREL) 50 mg tablet Take  by mouth nightly. Yes Provider, Historical   prazosin (MINIPRESS) 5 mg capsule Take  by mouth nightly. Yes Provider, Historical   buPROPion XL (WELLBUTRIN XL) 150 mg tablet Take 150 mg by mouth nightly. Yes Provider, Historical   amLODIPine (NORVASC) 2.5 mg tablet Take  by mouth daily. Yes Provider, Historical   aspirin delayed-release 81 mg tablet Take  by mouth daily. Yes Provider, Historical   atorvastatin (LIPITOR) 10 mg tablet Take  by mouth daily. Yes Provider, Historical   cholecalciferol (VITAMIN D3) (1000 Units /25 mcg) tablet Take  by mouth daily. Yes Provider, Historical   omega 3-DHA-EPA-fish oil 1,000 mg (120 mg-180 mg) capsule Take 1 Cap by mouth daily. Yes Provider, Historical   telmisartan (MICARDIS) 80 mg tablet Take 80 mg by mouth daily. Yes Provider, Historical   valACYclovir (VALTREX) 500 mg tablet Take  by mouth two (2) times daily as needed.    Yes Provider, Historical   sildenafil citrate (Viagra) 100 mg tablet Take 100 mg by mouth as needed for Erectile Dysfunction. Yes Provider, Historical   doxycycline (VIBRA-TABS) 100 mg tablet Take 100 mg by mouth. Patient not taking: Reported on 10/27/2021 1/5/21 10/27/21  Provider, Historical   Fish OiL 340-1,000 mg capsule  10/21/21 10/27/21  Provider, Historical   oxyCODONE IR (ROXICODONE) 5 mg immediate release tablet  3/9/21 10/27/21  Provider, Historical   calcium-cholecalciferol, D3, (CALTRATE 600+D) tablet  9/24/21 10/27/21  Provider, Historical   OTHER Will have 2nd Covid Vax 2/26/2021  10/27/21  Provider, Historical      Physical Exam  Vitals and nursing note reviewed. Constitutional:       Appearance: Normal appearance. HENT:      Head: Normocephalic. Nose: Nose normal.      Mouth/Throat:      Mouth: Mucous membranes are moist.   Eyes:      Pupils: Pupils are equal, round, and reactive to light. Cardiovascular:      Rate and Rhythm: Normal rate and regular rhythm. Pulmonary:      Effort: Pulmonary effort is normal.   Abdominal:      General: Abdomen is flat. Palpations: Abdomen is soft. Genitourinary:     Penis: Normal.       Testes: Normal.   Musculoskeletal:         General: Normal range of motion. Cervical back: Normal range of motion. Skin:     General: Skin is warm. Neurological:      General: No focal deficit present. Mental Status: He is alert and oriented to person, place, and time. Psychiatric:         Mood and Affect: Mood normal.         Behavior: Behavior normal.         Thought Content: Thought content normal.         Judgment: Judgment normal.       bladderScan shows 65 cc post void residual  ASSESSMENT and PLAN  1. Interstitial cystitis       gave Him prescription for the new beta-1 agonist, dietary control, may be a candidate for cystoscopy hydrodistention with rescue solution we will see him back in 1 month.   Between the discussions review of his literature review of the literature for IC and his puff questionnaire etc. he was with me over 46 minutes    Marita Tena NP

## 2022-01-04 NOTE — PROGRESS NOTES
HISTORY OF PRESENT ILLNESS  Jonna Reyes is a 60 y.o. male has a history of chronic prostatitis. As per Last appointment, patient voids about every hour he goes up a 15-20 times a day more he says. He has had this condition for more than 10 years he keeps getting told his prostatitis but it never goes away. Had a short trial of gemtesa, and had some good results. He would like to try a longer course which will give prescription for warned of possible side effects. He does not continue to work we can but he had Botox in his bladder or cystoscopy hydrodistention of his bladder or both    He is on prazocin. He may be a candidate for cystoscopy hydrodistention with rescue solution   His IPSS is 5 same as last appointment           HPI  Chronic Conditions Addressed Today     1. Chronic prostatitis     Overview      Patient of Dr. Mercedes Montiel with history of prostatitis and  Epidymidis successfully treated with doxycycline. the patient he voids about every hour he goes up a 15-20 times a day more he says. He has had this condition for more than 10 years he keeps getting told his prostatitis but it never goes away. 2. Enlarged prostate with urinary obstruction - Primary     Overview      PSA[de-identified]  10/2020=0.37  2/2021=0.38           3. Increased urinary frequency        Patient denies the symptoms of COVID-19 per routine screening guidelines. Review of Systems   Constitutional: Negative. HENT: Negative. Eyes: Negative. Respiratory: Negative. Cardiovascular: Negative. Gastrointestinal: Negative. Genitourinary: Positive for frequency and urgency. Skin: Negative. Neurological: Negative. Endo/Heme/Allergies: Negative. Psychiatric/Behavioral: Negative.         Past Medical History:  PMHx (including negatives):  has a past medical history of BPH (benign prostatic hyperplasia), Burning with urination, Depression, Frequent urination, H/O Bell's palsy (2017), Hypercholesterolemia, Hypertension, GENEVA on CPAP, Prostatitis (01/05/2021), PTSD (post-traumatic stress disorder), Stroke ([facility] [address] (Unknown), and Tinnitus of right ear. PSurgHx:  has a past surgical history that includes hx appendectomy and hx colonoscopy. PSocHx:  reports that he has never smoked. He has never used smokeless tobacco. He reports that he does not drink alcohol and does not use drugs. Home Medications    Medication Sig Start Date End Date Taking? Authorizing Provider   ondansetron hcl (ZOFRAN) 8 mg tablet  3/9/21   Provider, Historical   calcium carbonate/vitamin D3 (CALCIUM 500 + D, D3, PO) Take 1 Tab by mouth daily. Provider, Historical   traZODone (DESYREL) 50 mg tablet Take  by mouth nightly. Provider, Historical   prazosin (MINIPRESS) 5 mg capsule Take  by mouth nightly. Provider, Historical   buPROPion XL (WELLBUTRIN XL) 150 mg tablet Take 150 mg by mouth nightly. Provider, Historical   amLODIPine (NORVASC) 2.5 mg tablet Take  by mouth daily. Provider, Historical   aspirin delayed-release 81 mg tablet Take  by mouth daily. Provider, Historical   atorvastatin (LIPITOR) 10 mg tablet Take  by mouth daily. Provider, Historical   cholecalciferol (VITAMIN D3) (1000 Units /25 mcg) tablet Take  by mouth daily. Provider, Historical   omega 3-DHA-EPA-fish oil 1,000 mg (120 mg-180 mg) capsule Take 1 Cap by mouth daily. Provider, Historical   telmisartan (MICARDIS) 80 mg tablet Take 80 mg by mouth daily. Provider, Historical   valACYclovir (VALTREX) 500 mg tablet Take  by mouth two (2) times daily as needed. Provider, Historical   sildenafil citrate (Viagra) 100 mg tablet Take 100 mg by mouth as needed for Erectile Dysfunction. Provider, Historical      Physical Exam  Vitals and nursing note reviewed. HENT:      Head: Normocephalic.       Right Ear: Tympanic membrane normal.      Nose: Nose normal.      Mouth/Throat:      Mouth: Mucous membranes are moist.   Eyes:      Pupils: Pupils are equal, round, and reactive to light. Cardiovascular:      Rate and Rhythm: Normal rate and regular rhythm. Pulmonary:      Effort: Pulmonary effort is normal.   Abdominal:      General: Abdomen is flat. Palpations: Abdomen is soft. Genitourinary:     Comments: deferred  Musculoskeletal:         General: Normal range of motion. Cervical back: Normal range of motion. Skin:     General: Skin is warm. Neurological:      General: No focal deficit present. Mental Status: He is oriented to person, place, and time. Psychiatric:         Mood and Affect: Mood normal.         Behavior: Behavior normal.         Thought Content: Thought content normal.         Judgment: Judgment normal.         ASSESSMENT and PLAN  Diagnoses and all orders for this visit:    1. Enlarged prostate with urinary obstruction    2. Chronic prostatitis    3.  Increased urinary frequency           We will keep on gemtesa  and see back in 3 months

## 2022-01-05 ENCOUNTER — OFFICE VISIT (OUTPATIENT)
Dept: UROLOGY | Age: 57
End: 2022-01-05
Payer: OTHER GOVERNMENT

## 2022-01-05 VITALS
OXYGEN SATURATION: 99 % | HEIGHT: 65 IN | WEIGHT: 189 LBS | HEART RATE: 68 BPM | TEMPERATURE: 97.6 F | RESPIRATION RATE: 12 BRPM | DIASTOLIC BLOOD PRESSURE: 77 MMHG | BODY MASS INDEX: 31.49 KG/M2 | SYSTOLIC BLOOD PRESSURE: 132 MMHG

## 2022-01-05 DIAGNOSIS — N13.8 ENLARGED PROSTATE WITH URINARY OBSTRUCTION: Primary | ICD-10-CM

## 2022-01-05 DIAGNOSIS — N41.1 CHRONIC PROSTATITIS: ICD-10-CM

## 2022-01-05 DIAGNOSIS — N40.1 ENLARGED PROSTATE WITH URINARY OBSTRUCTION: Primary | ICD-10-CM

## 2022-01-05 DIAGNOSIS — R35.0 INCREASED URINARY FREQUENCY: ICD-10-CM

## 2022-01-05 PROBLEM — N52.9 MALE ERECTILE DISORDER: Status: ACTIVE | Noted: 2017-01-11

## 2022-01-05 PROBLEM — G47.33 OBSTRUCTIVE SLEEP APNEA OF ADULT: Status: ACTIVE | Noted: 2022-01-05

## 2022-01-05 PROBLEM — F32.A DEPRESSION: Status: ACTIVE | Noted: 2022-01-05

## 2022-01-05 PROBLEM — F43.12 CHRONIC POST-TRAUMATIC STRESS DISORDER (PTSD) AFTER MILITARY COMBAT: Status: ACTIVE | Noted: 2022-01-05

## 2022-01-05 PROBLEM — I63.81 LACUNAR INFARCTION (HCC): Status: ACTIVE | Noted: 2022-01-05

## 2022-01-05 PROCEDURE — 99214 OFFICE O/P EST MOD 30 MIN: CPT | Performed by: UROLOGY

## 2022-01-05 RX ORDER — VIBEGRON 75 MG/1
75 TABLET, FILM COATED ORAL DAILY
Qty: 90 EACH | Refills: 3 | Status: SHIPPED | OUTPATIENT
Start: 2022-01-05 | End: 2022-08-18

## 2022-01-05 NOTE — PROGRESS NOTES
Chief Complaint   Patient presents with    Follow-up     ROS, IPSS Forms    Prostatitis    Epididymitis         1. Have you been to the ER, urgent care clinic since your last visit? Hospitalized since your last visit? No    2. Have you seen or consulted any other health care providers outside of the [address] [place] [address] since your last visit? Include any pap smears or colon screening.  No      Visit Vitals  /77 (BP 1 Location: Left upper arm, BP Patient Position: Sitting, BP Cuff Size: Adult)   Pulse 68   Temp 97.6 °F (36.4 °C) (Temporal)   Resp 12   Ht 5' 5\" (1.651 m)   Wt 189 lb (85.7 kg)   SpO2 99%   BMI 31.45 kg/m²

## 2022-03-17 ENCOUNTER — TELEPHONE (OUTPATIENT)
Dept: UROLOGY | Age: 57
End: 2022-03-17

## 2022-03-17 RX ORDER — VIBEGRON 75 MG/1
75 TABLET, FILM COATED ORAL DAILY
Qty: 90 EACH | Refills: 3 | Status: SHIPPED | OUTPATIENT
Start: 2022-03-17 | End: 2022-08-18

## 2022-03-17 NOTE — TELEPHONE ENCOUNTER
Latasha Haskins should actually be getting sent to [facility]. Can you please fix this   And then the pt needs to know it will come in the mail but will more than likely need prior auth.  I cant complete that till its sent to right pharmacy- can you let him know this please crystal

## 2022-03-17 NOTE — TELEPHONE ENCOUNTER
Patient states that his vibegron Glory Cables) 75 mg tab medication needs to be sent to [facility] in [place] because [facility] called him and stated that they do not have it at all

## 2022-03-18 PROBLEM — H51.9 DISORDER OF EYE MOVEMENTS: Status: ACTIVE | Noted: 2021-10-27

## 2022-03-18 PROBLEM — N13.8 ENLARGED PROSTATE WITH URINARY OBSTRUCTION: Status: ACTIVE | Noted: 2020-08-03

## 2022-03-18 PROBLEM — E78.5 DYSLIPIDEMIA: Status: ACTIVE | Noted: 2021-10-27

## 2022-03-18 PROBLEM — M77.10 LATERAL EPICONDYLITIS (TENNIS ELBOW): Status: ACTIVE | Noted: 2021-10-27

## 2022-03-18 PROBLEM — S29.012A STRAIN OF THORACIC BACK REGION: Status: ACTIVE | Noted: 2021-10-27

## 2022-03-18 PROBLEM — N40.1 ENLARGED PROSTATE WITH URINARY OBSTRUCTION: Status: ACTIVE | Noted: 2020-08-03

## 2022-03-18 PROBLEM — R14.0 BLOATING: Status: ACTIVE | Noted: 2021-10-27

## 2022-03-18 PROBLEM — L73.9 FOLLICULITIS: Status: ACTIVE | Noted: 2021-10-27

## 2022-03-18 PROBLEM — L73.0 ACNE KELOIDALIS: Status: ACTIVE | Noted: 2021-10-27

## 2022-03-18 PROBLEM — G47.30 SLEEP APNEA: Status: ACTIVE | Noted: 2021-10-27

## 2022-03-18 PROBLEM — F32.A DEPRESSION: Status: ACTIVE | Noted: 2022-01-05

## 2022-03-19 PROBLEM — E66.3 OVERWEIGHT: Status: ACTIVE | Noted: 2021-10-27

## 2022-03-19 PROBLEM — M25.569 PAIN IN JOINT, LOWER LEG: Status: ACTIVE | Noted: 2021-10-27

## 2022-03-19 PROBLEM — I63.81 LACUNAR INFARCTION (HCC): Status: ACTIVE | Noted: 2022-01-05

## 2022-03-19 PROBLEM — I10 HYPERTENSION: Status: ACTIVE | Noted: 2021-10-27

## 2022-03-19 PROBLEM — R68.82 REDUCED LIBIDO: Status: ACTIVE | Noted: 2021-10-27

## 2022-03-19 PROBLEM — M25.519 PAIN IN UNSPECIFIED SHOULDER: Status: ACTIVE | Noted: 2021-10-27

## 2022-03-19 PROBLEM — F52.21 MALE ERECTILE DISORDER (CODE): Status: ACTIVE | Noted: 2017-01-11

## 2022-03-19 PROBLEM — M27.2 INFLAMMATORY CONDITION OF JAW: Status: ACTIVE | Noted: 2021-10-27

## 2022-03-19 PROBLEM — G47.33 OBSTRUCTIVE SLEEP APNEA OF ADULT: Status: ACTIVE | Noted: 2022-01-05

## 2022-03-19 PROBLEM — N49.1 VASITIS: Status: ACTIVE | Noted: 2020-08-03

## 2022-03-19 PROBLEM — R80.9 PROTEINURIA: Status: ACTIVE | Noted: 2021-10-27

## 2022-03-19 PROBLEM — E78.5 HYPERLIPIDEMIA, UNSPECIFIED: Status: ACTIVE | Noted: 2017-01-11

## 2022-03-19 PROBLEM — R35.0 INCREASED URINARY FREQUENCY: Status: ACTIVE | Noted: 2020-08-03

## 2022-03-19 PROBLEM — Z80.42 FAMILY HISTORY OF PROSTATE CANCER: Status: ACTIVE | Noted: 2020-08-03

## 2022-03-19 PROBLEM — M79.646 PAIN OF FINGER: Status: ACTIVE | Noted: 2021-10-27

## 2022-03-19 PROBLEM — N41.1 CHRONIC PROSTATITIS: Status: ACTIVE | Noted: 2020-08-03

## 2022-03-19 PROBLEM — H11.009 PTERYGIUM OF EYE: Status: ACTIVE | Noted: 2021-10-27

## 2022-03-19 PROBLEM — I51.89 COMBINED SYSTOLIC AND DIASTOLIC CARDIAC DYSFUNCTION: Status: ACTIVE | Noted: 2021-10-27

## 2022-03-20 PROBLEM — I10 ESSENTIAL (PRIMARY) HYPERTENSION: Status: ACTIVE | Noted: 2017-01-11

## 2022-03-20 PROBLEM — F43.12 CHRONIC POST-TRAUMATIC STRESS DISORDER (PTSD) AFTER MILITARY COMBAT: Status: ACTIVE | Noted: 2022-01-05

## 2022-03-20 PROBLEM — N52.9 MALE ERECTILE DISORDER: Status: ACTIVE | Noted: 2017-01-11

## 2022-03-20 PROBLEM — R35.1 NOCTURIA: Status: ACTIVE | Noted: 2020-08-03

## 2022-03-20 PROBLEM — E78.6 FAMILIAL HYPOALPHALIPOPROTEINEMIA: Status: ACTIVE | Noted: 2021-10-27

## 2022-03-20 PROBLEM — J30.9 ALLERGIC RHINITIS: Status: ACTIVE | Noted: 2021-10-27

## 2022-03-28 ENCOUNTER — TELEPHONE (OUTPATIENT)
Dept: UROLOGY | Age: 57
End: 2022-03-28

## 2022-03-28 NOTE — TELEPHONE ENCOUNTER
oumar just called her name is ludy  they been trying to reach this pt about insurance for medication but cant reach pt stated that pt hang up on them thinking they were bill collectors  I told Yung Palm that I would contact pt and let him know that its for his medication and its ready but they have a few questions to ask him about his insurance he then stated that he would call them back I give him the number and done

## 2022-05-03 ENCOUNTER — TELEPHONE (OUTPATIENT)
Dept: UROLOGY | Age: 57
End: 2022-05-03

## 2022-05-03 NOTE — TELEPHONE ENCOUNTER
Patient was returning israel message that she left and he stated he would cancel his appt and then get his PCP to send a new referral in then he will reschedule his appt

## 2022-05-03 NOTE — TELEPHONE ENCOUNTER
LVM for pt to let him know that we do not have an updated referral for him for his office visits- he can obtain that from his PCP- advised he will need to sign waiver stating he does not have referral, and once we receive it we will put it in for him.  Advised to call back if any questions,

## 2022-06-16 NOTE — TELEPHONE ENCOUNTER
LVm for patient, received his [facility] referral, however it is for Dr. Marty Limon and the referral needs to be for Dr. Steve Kraft which is who he is established with.  Explained on VM he can contact marcie or Dr. Orville Hebert to update that, provided him with Dr. Allison Rodriguez and our address here in [place], and once we receive that updated referral we can reschedule his appt, advised to call back

## 2022-08-17 PROBLEM — N30.10 INTERSTITIAL CYSTITIS: Status: ACTIVE | Noted: 2022-08-17

## 2022-08-17 PROBLEM — N32.81 OVERACTIVE BLADDER: Status: ACTIVE | Noted: 2022-08-17

## 2022-08-17 NOTE — PROGRESS NOTES
HISTORY OF PRESENT ILLNESS  Ramon Briceno is a 60 y.o. male. Chief Complaint   Patient presents with    Follow-up    Prostatitis    Urinary Frequency     Past Medical History:  PMHx (including negatives):  has a past medical history of BPH (benign prostatic hyperplasia), Burning with urination, Depression, Frequent urination, H/O Bell's palsy (2017), Hypercholesterolemia, Hypertension, GENEVA on CPAP, Prostatitis (01/05/2021), PTSD (post-traumatic stress disorder), [facility] (Unknown), and Tinnitus of right ear. PSurgHx:  has a past surgical history that includes hx appendectomy and hx colonoscopy. PSocHx:  reports that he has never smoked. He has never used smokeless tobacco. He reports that he does not drink alcohol and does not use drugs. He is here today in follow-up. He has very bothersome lower urinary tract symptoms, particularly urinary frequency. He reported a past diagnosis of chronic prostatitis and overactivity. He did not respond to antibiotic therapy. He has been dealing with these symptoms for greater than 10 years. He reported some responsiveness to [place]. That medication was continued at his last visit date on 1/5/2022. We discussed treatment with bladder Botox therapy or hydrodistention with rescue solution instillation if he is not doing well on Gemtesa. He did well on Gemtesa and found it to be effective. A trial period, his insurance rejected coverage for the medication. He is not a candidate for anticholinergic therapy as he has chronic constipation, severe. These medications or medications in this class will exacerbate that issue. Is also not a candidate for Myrbetriq, as he is hypertensive at baseline. Chronic Conditions Addressed Today       1. Chronic prostatitis     Overview      He has bothersome lower urinary tract symptoms particularly frequency. He was treated for prostatitis several times in the past and reports no improvement on antibiotic therapy. 2. Family history of prostate cancer    3. Enlarged prostate with urinary obstruction     Overview      PSA[de-identified]  10/2020=0.37  2/2021=0.38           4. Increased urinary frequency     Overview      1/5/22: He has been diagnosed with prostatitis in the past.  He feels that he does not respond to antibiotic therapy. He is most bothered by urinary frequency up to 20 times/day. He was trialed on a short course of Gemtesa and found not to be very helpful. He may be a candidate for bladder Botox therapy or hydrodistention with rescue solution. Relevant Orders     AMB POC URINALYSIS DIP STICK AUTO W/O MICRO     AMB POC PVR, DANYEL,POST-VOID RES,US,NON-IMAGING    5. Hypertension     Overview      Baseline hypertension, followed by PCP. On medication. Current Assessment & Plan      He has a history of hypertension, on medication. Thus he is not a candidate for Myrbetriq for his urinary issues. Relevant Medications     Fish OiL 340-1,000 mg capsule    6. Interstitial cystitis     Overview      1/2022: He is very bothered by lower urinary tract symptoms. He reports no responsiveness to treatment for prostatitis in the past.  He had some relief from Anthony Corea. He may be a candidate for bladder Botox therapy or hydrodistention with rescue solution instillation. Relevant Orders     AMB POC URINALYSIS DIP STICK AUTO W/O MICRO     AMB POC PVR, DANYEL,POST-VOID RES,US,NON-IMAGING    7. Overactive bladder - Primary     Overview      1/2022: He is very bothered by lower urinary tract symptoms. He reports no responsiveness to treatment for prostatitis in the past.  He had some relief from Anthony Corea. He may be a candidate for bladder Botox therapy or hydrodistention with rescue solution instillation. Relevant Orders     AMB POC URINALYSIS DIP STICK AUTO W/O MICRO     AMB POC PVR, DANYEL,POST-VOID RES,US,NON-IMAGING    8.  Other constipation     Overview      Baseline issues with severe constipation. Current Assessment & Plan      He has severe baseline constipation. He is not candidate for anticholinergic therapy as this will exacerbate his pre-existing condition. He did well on Gemtesa. He should continue [place]. We will submit an appeal letter to his insurance company. IPSS  Score: 6      Review of Systems   All other systems reviewed and are negative. Patient denies the symptoms of COVID-19 per routine screening guidelines. Physical Exam  Vitals and nursing note reviewed. HENT:      Head: Normocephalic. Right Ear: Tympanic membrane normal.      Nose: Nose normal.      Mouth/Throat:      Mouth: Mucous membranes are moist.   Eyes:      Pupils: Pupils are equal, round, and reactive to light. Cardiovascular:      Rate and Rhythm: Normal rate and regular rhythm. Pulmonary:      Effort: Pulmonary effort is normal.   Abdominal:      General: Abdomen is flat. Palpations: Abdomen is soft. Genitourinary:     Comments: deferred  Musculoskeletal:         General: Normal range of motion. Cervical back: Normal range of motion. Skin:     General: Skin is warm. Neurological:      General: No focal deficit present. Mental Status: He is oriented to person, place, and time. Psychiatric:         Mood and Affect: Mood normal.         Behavior: Behavior normal.         Thought Content: Thought content normal.      ASSESSMENT and PLAN  Diagnoses and all orders for this visit:    1. Overactive bladder  -     AMB POC URINALYSIS DIP STICK AUTO W/O MICRO  -     AMB POC PVR, DANYEL,POST-VOID RES,US,NON-IMAGING    2. Interstitial cystitis  -     AMB POC URINALYSIS DIP STICK AUTO W/O MICRO  -     AMB POC PVR, DANYEL,POST-VOID RES,US,NON-IMAGING    3. Increased urinary frequency  -     AMB POC URINALYSIS DIP STICK AUTO W/O MICRO  -     AMB POC PVR, DANYEL,POST-VOID RES,US,NON-IMAGING    4. Enlarged prostate with urinary obstruction    5. Chronic prostatitis    6. Family history of prostate cancer    7. Other constipation  Assessment & Plan:  He has severe baseline constipation. He is not candidate for anticholinergic therapy as this will exacerbate his pre-existing condition. He did well on Gemtesa. He should continue [place]. We will submit an appeal letter to his insurance company. 8. Secondary hypertension  Assessment & Plan:  He has a history of hypertension, on medication. Thus he is not a candidate for Myrbetriq for his urinary issues. Mr. Casimiro Santos has severe baseline issues with constipation. He also has hypertension, on medication. It is our recommendation that the patient remain on Gemtesa for his urologic conditions. Medications and similar drug classes are contraindicated due to his underlying chronic conditions. Follow-up and Dispositions    Return if symptoms worsen or fail to improve. Gwen Khan may have a reminder for a \"due or due soon\" health maintenance. The patient has been encouraged to contact their primary care provider for follow-up on this health maintenance or other necessary and/or routine health screening.

## 2022-08-18 ENCOUNTER — OFFICE VISIT (OUTPATIENT)
Dept: UROLOGY | Age: 57
End: 2022-08-18
Payer: OTHER GOVERNMENT

## 2022-08-18 ENCOUNTER — TELEPHONE (OUTPATIENT)
Dept: UROLOGY | Age: 57
End: 2022-08-18

## 2022-08-18 VITALS
DIASTOLIC BLOOD PRESSURE: 81 MMHG | TEMPERATURE: 97.8 F | HEART RATE: 63 BPM | SYSTOLIC BLOOD PRESSURE: 142 MMHG | RESPIRATION RATE: 16 BRPM | OXYGEN SATURATION: 99 %

## 2022-08-18 DIAGNOSIS — N13.8 ENLARGED PROSTATE WITH URINARY OBSTRUCTION: ICD-10-CM

## 2022-08-18 DIAGNOSIS — N30.10 INTERSTITIAL CYSTITIS: ICD-10-CM

## 2022-08-18 DIAGNOSIS — I15.9 SECONDARY HYPERTENSION: ICD-10-CM

## 2022-08-18 DIAGNOSIS — N32.81 OVERACTIVE BLADDER: Primary | ICD-10-CM

## 2022-08-18 DIAGNOSIS — R35.0 INCREASED URINARY FREQUENCY: ICD-10-CM

## 2022-08-18 DIAGNOSIS — N40.1 ENLARGED PROSTATE WITH URINARY OBSTRUCTION: ICD-10-CM

## 2022-08-18 DIAGNOSIS — N41.1 CHRONIC PROSTATITIS: ICD-10-CM

## 2022-08-18 DIAGNOSIS — Z80.42 FAMILY HISTORY OF PROSTATE CANCER: ICD-10-CM

## 2022-08-18 DIAGNOSIS — K59.09 OTHER CONSTIPATION: ICD-10-CM

## 2022-08-18 PROBLEM — M25.562 ARTHRALGIA OF LEFT KNEE: Status: ACTIVE | Noted: 2022-08-18

## 2022-08-18 LAB
BILIRUB UR QL: NEGATIVE
GLUCOSE UR-MCNC: NEGATIVE MG/DL
KETONES P FAST UR STRIP-MCNC: NEGATIVE MG/DL
PH UR STRIP: 7 [PH] (ref 4.6–8)
PROT UR QL STRIP: NEGATIVE
PVR POC: NORMAL CC
SP GR UR STRIP: 1.02 (ref 1–1.03)
UA UROBILINOGEN AMB POC: NORMAL (ref 0.2–1)
URINALYSIS CLARITY POC: CLEAR
URINALYSIS COLOR POC: YELLOW
URINE BLOOD POC: NEGATIVE
URINE LEUKOCYTES POC: NEGATIVE
URINE NITRITES POC: NEGATIVE

## 2022-08-18 PROCEDURE — 81003 URINALYSIS AUTO W/O SCOPE: CPT | Performed by: UROLOGY

## 2022-08-18 PROCEDURE — 99214 OFFICE O/P EST MOD 30 MIN: CPT | Performed by: UROLOGY

## 2022-08-18 PROCEDURE — 51798 US URINE CAPACITY MEASURE: CPT | Performed by: UROLOGY

## 2022-08-18 RX ORDER — DIPHENHYDRAMINE HCL 25 MG
CAPSULE ORAL
COMMUNITY
Start: 2022-08-01

## 2022-08-18 RX ORDER — ACETAMINOPHEN 500 MG
1000 TABLET ORAL
COMMUNITY
Start: 2022-04-13

## 2022-08-18 RX ORDER — OMEGA-3 FATTY ACIDS/FISH OIL 340-1000MG
CAPSULE ORAL
COMMUNITY
Start: 2022-06-03

## 2022-08-18 RX ORDER — VIBEGRON 75 MG/1
75 TABLET, FILM COATED ORAL DAILY
Qty: 90 EACH | Refills: 3 | Status: SHIPPED | OUTPATIENT
Start: 2022-08-18

## 2022-08-18 RX ORDER — CLOBETASOL PROPIONATE 0.5 MG/G
0.05 CREAM TOPICAL
COMMUNITY
Start: 2022-03-23 | End: 2023-03-23

## 2022-08-18 NOTE — ASSESSMENT & PLAN NOTE
He has a history of hypertension, on medication. Thus he is not a candidate for Myrbetriq for his urinary issues.

## 2022-08-18 NOTE — TELEPHONE ENCOUNTER
Please submit the following letter on letterhead to insurance company for denial of [address]. Dear Emerson Louis;     Mr. Chris Sesay is under the care of Dr. Aryan Jin at [address] [place] [address] Urology. He is being seen for his urinary issues which include: Bothersome lower urinary tract symptoms, particularly urinary frequency. He reported responsiveness to a trial of Gemtesa, 75 mg. He has not successfully trialed other treatment modalities and/or drugs, so we continue to recommend Gemtesa, 75 mg. At this time I am requesting coverage for continuous therapy for 12 months. He is not a candidate for mirabegron secondary to hypertension. He not a candidate for anticholinergic therapy secondary to his severe issues with chronic constipation. Medications in this class will exacerbate his chronic condition and increase his risk for associated conditions such as the development of hemorrhoids, anal fissuring, fecal incontinence, fecal impaction, and bowel obstruction or perforation. Please reconsider providing coverage for Gemtesa, as it is the medically preferred agent. Sincerely,     The office of Dr. Aryan Jin and associates at [address] [place] [address] Urology.

## 2022-08-18 NOTE — ASSESSMENT & PLAN NOTE
He has severe baseline constipation. He is not candidate for anticholinergic therapy as this will exacerbate his pre-existing condition. He did well on Gemtesa. He should continue [place]. We will submit an appeal letter to his insurance company.

## 2022-08-18 NOTE — PROGRESS NOTES
Chief Complaint   Patient presents with    Follow-up    Prostatitis    Urinary Frequency       PHQ-9 score is    Negative    Vitals:    08/18/22 0919   BP: (!) 142/81   Pulse: 63   Resp: 16   Temp: 97.8 °F (36.6 °C)   SpO2: 99%        1. \"Have you been to the ER, urgent care clinic since your last visit? Hospitalized since your last visit? \" No    2. \"Have you seen or consulted any other health care providers outside of the [address] [place] [address] since your last visit? \" No     3. For patients aged 39-70: Has the patient had a colonoscopy / FIT/ Cologuard? Yes - no Care Gap present      If the patient is female:    4. For patients aged 41-77: Has the patient had a mammogram within the past 2 years? NA - based on age or sex      11. For patients aged 21-65: Has the patient had a pap smear?  NA - based on age or sex

## 2022-08-19 ENCOUNTER — TELEPHONE (OUTPATIENT)
Dept: UROLOGY | Age: 57
End: 2022-08-19

## 2022-11-10 NOTE — PROGRESS NOTES
HISTORY OF PRESENT ILLNESS    Chago Mena is a 60 y.o. male. 1/2022: He has a is very bothersome by lower urinary tract symptoms e  He reports no responsiveness to treatment for prostatitis in the past.  He had some relief from [place]. He may be a candidate for bladder Botox therapy or hydrodistention with rescue solution instillation. He is on prazocin. He may be a candidate for cystoscopy hydrodistention with rescue solution  His IPSS was 5 same as last appointment        Today he reports that gemtesa did not help him  a lot. His main complaint is frequency. He goes to the bathroom every 2 hours, at times more. He reports constipation, has been prescribed fiber by PCP. His IPSS is 5 for frequency. PVR is occ  PSA from 2019=0.38  Urine is negative. Patient has an average puff score. He has never been cystoscope. He does not respond to p.o. medication. I think is a candidate for evaluation possible hydrodistention and if that is negative go ahead and inject 100 units of Botox in 20 divided doses in his bladder. He is aware the risk of bleeding infection injury to the bladder urethra pulmonary embolus and death he is aware of alternatives he has no questions. He was here over 35 minutes with 30% time was direct patient care       Past Medical History:  PMHx (including negatives):  has a past medical history of BPH (benign prostatic hyperplasia), Burning with urination, Depression, Frequent urination, H/O Bell's palsy (2017), Hypercholesterolemia, Hypertension, GENEVA on CPAP, Prostatitis (01/05/2021), PTSD (post-traumatic stress disorder), [facility] (Unknown), and Tinnitus of right ear. PSurgHx:  has a past surgical history that includes hx appendectomy and hx colonoscopy. PSocHx:  reports that he has never smoked. He has never used smokeless tobacco. He reports that he does not drink alcohol and does not use drugs. Home Medications    Medication Sig Start Date End Date Taking?  Authorizing Provider   acetaminophen (TYLENOL) 500 mg tablet 1,000 mg. 4/13/22   Provider, Historical   clobetasoL (TEMOVATE) 0.05 % topical cream Apply 0.05 % to affected area. 3/23/22 3/23/23  Provider, Historical   diphenhydrAMINE (BENADRYL) 25 mg capsule  8/1/22   Provider, Historical   Fish OiL 340-1,000 mg capsule  6/3/22   Provider, Historical   vibegron (Gemtesa) 75 mg tab Take 75 mg by mouth daily. Take 1 tab daily 8/18/22   Gavin Thompson NP   calcium carbonate/vitamin D3 (CALCIUM 500 + D, D3, PO) Take 1 Tab by mouth daily. Provider, Historical   traZODone (DESYREL) 50 mg tablet Take  by mouth nightly. Provider, Historical   prazosin (MINIPRESS) 5 mg capsule Take  by mouth nightly. Provider, Historical   buPROPion XL (WELLBUTRIN XL) 150 mg tablet Take 150 mg by mouth nightly. Provider, Historical   amLODIPine (NORVASC) 2.5 mg tablet Take  by mouth daily. Provider, Historical   aspirin delayed-release 81 mg tablet Take  by mouth daily. Provider, Historical   atorvastatin (LIPITOR) 10 mg tablet Take  by mouth daily. Provider, Historical   cholecalciferol (VITAMIN D3) (1000 Units /25 mcg) tablet Take  by mouth daily. Provider, Historical   omega 3-DHA-EPA-fish oil 1,000 mg (120 mg-180 mg) capsule Take 1 Cap by mouth daily. Provider, Historical   telmisartan (MICARDIS) 80 mg tablet Take 80 mg by mouth daily. Provider, Historical   valACYclovir (VALTREX) 500 mg tablet Take  by mouth two (2) times daily as needed. Provider, Historical   sildenafil citrate (Viagra) 100 mg tablet Take 100 mg by mouth as needed for Erectile Dysfunction. Provider, Historical        Chronic Conditions Addressed Today       1. Chronic prostatitis - Primary     Overview      He has bothersome lower urinary tract symptoms particularly frequency. He was treated for prostatitis several times in the past and reports no improvement on antibiotic therapy.            2. Enlarged prostate with urinary obstruction Overview      PSA[de-identified]  10/2020=0.37  2/2021=0.38           3. Interstitial cystitis     Overview      1/2022: He is very bothered by lower urinary tract symptoms. He reports no responsiveness to treatment for prostatitis in the past.  He had some relief from [place]. He may be a candidate for bladder Botox therapy or hydrodistention with rescue solution instillation. 4. Overactive bladder     Overview      1/2022: He is very bothered by lower urinary tract symptoms. He reports no responsiveness to treatment for prostatitis in the past.  He had some relief from [place]. He may be a candidate for bladder Botox therapy or hydrodistention with rescue solution instillation. Review of Systems   Constitutional: Negative. HENT: Negative. Eyes: Negative. Respiratory: Negative. Cardiovascular: Negative. Gastrointestinal: Negative. Genitourinary:  Positive for frequency. Skin: Negative. Neurological: Negative. Endo/Heme/Allergies: Negative. Psychiatric/Behavioral: Negative. Patient denies the symptoms of COVID-19 per routine screening guidelines. Physical Exam    ASSESSMENT and PLAN  Diagnoses and all orders for this visit:    1. Chronic prostatitis    2. Enlarged prostate with urinary obstruction    3. Overactive bladder    4. Interstitial cystitis       Cystoscopy hydrodistention possible Botox injection if the hydrodistention is negative he is aware the risk benefits please see above he has no questions          Ronn Apple may have a reminder for a \"due or due soon\" health maintenance. The patient has been encouraged to contact their primary care provider for follow-up on this health maintenance or other necessary and/or routine health screening.

## 2022-11-14 ENCOUNTER — OFFICE VISIT (OUTPATIENT)
Dept: UROLOGY | Age: 57
End: 2022-11-14
Payer: OTHER GOVERNMENT

## 2022-11-14 VITALS
HEIGHT: 65 IN | SYSTOLIC BLOOD PRESSURE: 146 MMHG | OXYGEN SATURATION: 100 % | DIASTOLIC BLOOD PRESSURE: 94 MMHG | RESPIRATION RATE: 16 BRPM | TEMPERATURE: 96.6 F | WEIGHT: 189 LBS | BODY MASS INDEX: 31.49 KG/M2 | HEART RATE: 72 BPM

## 2022-11-14 DIAGNOSIS — N30.10 INTERSTITIAL CYSTITIS: ICD-10-CM

## 2022-11-14 DIAGNOSIS — N40.1 ENLARGED PROSTATE WITH URINARY OBSTRUCTION: ICD-10-CM

## 2022-11-14 DIAGNOSIS — N32.81 OVERACTIVE BLADDER: ICD-10-CM

## 2022-11-14 DIAGNOSIS — N13.8 ENLARGED PROSTATE WITH URINARY OBSTRUCTION: ICD-10-CM

## 2022-11-14 DIAGNOSIS — N41.1 CHRONIC PROSTATITIS: Primary | ICD-10-CM

## 2022-11-14 LAB
BILIRUB UR QL: NEGATIVE
GLUCOSE UR-MCNC: NEGATIVE MG/DL
KETONES P FAST UR STRIP-MCNC: NEGATIVE MG/DL
PH UR STRIP: 7.5 [PH] (ref 4.6–8)
PROT UR QL STRIP: NEGATIVE
PVR POC: NORMAL CC
SP GR UR STRIP: 1.02 (ref 1–1.03)
UA UROBILINOGEN AMB POC: NORMAL (ref 0.2–1)
URINALYSIS CLARITY POC: CLEAR
URINALYSIS COLOR POC: YELLOW
URINE BLOOD POC: NEGATIVE
URINE LEUKOCYTES POC: NEGATIVE
URINE NITRITES POC: NEGATIVE

## 2022-11-14 PROCEDURE — 3074F SYST BP LT 130 MM HG: CPT | Performed by: UROLOGY

## 2022-11-14 PROCEDURE — 99214 OFFICE O/P EST MOD 30 MIN: CPT | Performed by: UROLOGY

## 2022-11-14 PROCEDURE — 51798 US URINE CAPACITY MEASURE: CPT | Performed by: UROLOGY

## 2022-11-14 PROCEDURE — 3078F DIAST BP <80 MM HG: CPT | Performed by: UROLOGY

## 2022-11-14 PROCEDURE — 81003 URINALYSIS AUTO W/O SCOPE: CPT | Performed by: UROLOGY

## 2022-11-14 NOTE — PROGRESS NOTES
Chief Complaint   Patient presents with    Follow-up    Prostatitis    Urinary Frequency       PHQ-9 score is    Negative    Vitals:    11/14/22 0936   BP: (!) 158/90   Pulse: 72   Resp: 16   Temp: (!) 96.6 °F (35.9 °C)   TempSrc: Temporal   SpO2: 100%   Weight: 189 lb (85.7 kg)   Height: 5' 5\" (1.651 m)        1. \"Have you been to the ER, urgent care clinic since your last visit? Hospitalized since your last visit? \" No    2. \"Have you seen or consulted any other health care providers outside of the [address] [place] [address] since your last visit? \" No     3. For patients aged 39-70: Has the patient had a colonoscopy / FIT/ Cologuard? No      If the patient is female:    4. For patients aged 41-77: Has the patient had a mammogram within the past 2 years? NA - based on age or sex      11. For patients aged 21-65: Has the patient had a pap smear?  NA - based on age or sex

## 2022-12-08 NOTE — PERIOP NOTES
Pt instructed to hold aspirin starting tomorrow per Dr. Buddy Abrams. Pt verbalized understanding, Will contact PCP to obtain note.

## 2022-12-13 ENCOUNTER — ANESTHESIA (OUTPATIENT)
Dept: SURGERY | Age: 57
End: 2022-12-13
Payer: OTHER GOVERNMENT

## 2022-12-13 ENCOUNTER — HOSPITAL ENCOUNTER (OUTPATIENT)
Age: 57
Discharge: HOME | End: 2022-12-13
Attending: UROLOGY | Admitting: UROLOGY
Payer: OTHER GOVERNMENT

## 2022-12-13 ENCOUNTER — ANESTHESIA EVENT (OUTPATIENT)
Dept: SURGERY | Age: 57
End: 2022-12-13
Payer: OTHER GOVERNMENT

## 2022-12-13 VITALS
DIASTOLIC BLOOD PRESSURE: 83 MMHG | TEMPERATURE: 97.9 F | BODY MASS INDEX: 30.82 KG/M2 | HEART RATE: 73 BPM | SYSTOLIC BLOOD PRESSURE: 127 MMHG | RESPIRATION RATE: 20 BRPM | OXYGEN SATURATION: 99 % | HEIGHT: 65 IN | WEIGHT: 185 LBS

## 2022-12-13 PROBLEM — N30.10 CHRONIC INTERSTITIAL CYSTITIS: Status: ACTIVE | Noted: 2022-12-13

## 2022-12-13 PROCEDURE — 76060000032 HC ANESTHESIA 0.5 TO 1 HR: Performed by: UROLOGY

## 2022-12-13 PROCEDURE — 76210000026 HC REC RM PH II 1 TO 1.5 HR: Performed by: UROLOGY

## 2022-12-13 PROCEDURE — 76210000063 HC OR PH I REC FIRST 0.5 HR: Performed by: UROLOGY

## 2022-12-13 PROCEDURE — 77030040361 HC SLV COMPR DVT MDII -B: Performed by: UROLOGY

## 2022-12-13 PROCEDURE — 74011000250 HC RX REV CODE- 250: Performed by: UROLOGY

## 2022-12-13 PROCEDURE — 74011250636 HC RX REV CODE- 250/636: Performed by: UROLOGY

## 2022-12-13 PROCEDURE — 2709999900 HC NON-CHARGEABLE SUPPLY: Performed by: UROLOGY

## 2022-12-13 PROCEDURE — 74011250636 HC RX REV CODE- 250/636: Performed by: ANESTHESIOLOGY

## 2022-12-13 PROCEDURE — 74011250636 HC RX REV CODE- 250/636: Performed by: REGISTERED NURSE

## 2022-12-13 PROCEDURE — 77030034696 HC CATH URETH FOL 2W BARD -A: Performed by: UROLOGY

## 2022-12-13 PROCEDURE — 74011000250 HC RX REV CODE- 250: Performed by: REGISTERED NURSE

## 2022-12-13 PROCEDURE — 76010000138 HC OR TIME 0.5 TO 1 HR: Performed by: UROLOGY

## 2022-12-13 RX ORDER — KETOROLAC TROMETHAMINE 30 MG/ML
INJECTION, SOLUTION INTRAMUSCULAR; INTRAVENOUS AS NEEDED
Status: DISCONTINUED | OUTPATIENT
Start: 2022-12-13 | End: 2022-12-13 | Stop reason: HOSPADM

## 2022-12-13 RX ORDER — LEVOFLOXACIN 500 MG/1
500 TABLET, FILM COATED ORAL DAILY
Qty: 5 TABLET | Refills: 1 | Status: SHIPPED | OUTPATIENT
Start: 2022-12-13

## 2022-12-13 RX ORDER — CEFAZOLIN SODIUM 1 G/3ML
INJECTION, POWDER, FOR SOLUTION INTRAMUSCULAR; INTRAVENOUS AS NEEDED
Status: DISCONTINUED | OUTPATIENT
Start: 2022-12-13 | End: 2022-12-13 | Stop reason: HOSPADM

## 2022-12-13 RX ORDER — ONDANSETRON 4 MG/1
4 TABLET, ORALLY DISINTEGRATING ORAL ONCE
Status: COMPLETED | OUTPATIENT
Start: 2022-12-13 | End: 2022-12-13

## 2022-12-13 RX ORDER — DEXAMETHASONE SODIUM PHOSPHATE 4 MG/ML
INJECTION, SOLUTION INTRA-ARTICULAR; INTRALESIONAL; INTRAMUSCULAR; INTRAVENOUS; SOFT TISSUE AS NEEDED
Status: DISCONTINUED | OUTPATIENT
Start: 2022-12-13 | End: 2022-12-13 | Stop reason: HOSPADM

## 2022-12-13 RX ORDER — ONDANSETRON 2 MG/ML
INJECTION INTRAMUSCULAR; INTRAVENOUS AS NEEDED
Status: DISCONTINUED | OUTPATIENT
Start: 2022-12-13 | End: 2022-12-13 | Stop reason: HOSPADM

## 2022-12-13 RX ORDER — PROPOFOL 10 MG/ML
INJECTION, EMULSION INTRAVENOUS AS NEEDED
Status: DISCONTINUED | OUTPATIENT
Start: 2022-12-13 | End: 2022-12-13 | Stop reason: HOSPADM

## 2022-12-13 RX ORDER — SODIUM CHLORIDE, SODIUM LACTATE, POTASSIUM CHLORIDE, CALCIUM CHLORIDE 600; 310; 30; 20 MG/100ML; MG/100ML; MG/100ML; MG/100ML
INJECTION, SOLUTION INTRAVENOUS
Status: DISCONTINUED | OUTPATIENT
Start: 2022-12-13 | End: 2022-12-13 | Stop reason: HOSPADM

## 2022-12-13 RX ORDER — FENTANYL CITRATE 50 UG/ML
25 INJECTION, SOLUTION INTRAMUSCULAR; INTRAVENOUS
Status: DISCONTINUED | OUTPATIENT
Start: 2022-12-13 | End: 2022-12-13 | Stop reason: HOSPADM

## 2022-12-13 RX ORDER — HYDROMORPHONE HYDROCHLORIDE 1 MG/ML
0.5 INJECTION, SOLUTION INTRAMUSCULAR; INTRAVENOUS; SUBCUTANEOUS
Status: DISCONTINUED | OUTPATIENT
Start: 2022-12-13 | End: 2022-12-13 | Stop reason: HOSPADM

## 2022-12-13 RX ORDER — SODIUM CHLORIDE, SODIUM LACTATE, POTASSIUM CHLORIDE, CALCIUM CHLORIDE 600; 310; 30; 20 MG/100ML; MG/100ML; MG/100ML; MG/100ML
1000 INJECTION, SOLUTION INTRAVENOUS CONTINUOUS
Status: DISCONTINUED | OUTPATIENT
Start: 2022-12-13 | End: 2022-12-13 | Stop reason: HOSPADM

## 2022-12-13 RX ORDER — LIDOCAINE HYDROCHLORIDE 20 MG/ML
INJECTION, SOLUTION EPIDURAL; INFILTRATION; INTRACAUDAL; PERINEURAL AS NEEDED
Status: DISCONTINUED | OUTPATIENT
Start: 2022-12-13 | End: 2022-12-13 | Stop reason: HOSPADM

## 2022-12-13 RX ORDER — ATROPA BELLADONNA AND OPIUM 16.2; 6 MG/1; MG/1
1 SUPPOSITORY RECTAL ONCE
Status: DISCONTINUED | OUTPATIENT
Start: 2022-12-13 | End: 2022-12-13 | Stop reason: HOSPADM

## 2022-12-13 RX ORDER — ONDANSETRON 2 MG/ML
4 INJECTION INTRAMUSCULAR; INTRAVENOUS AS NEEDED
Status: DISCONTINUED | OUTPATIENT
Start: 2022-12-13 | End: 2022-12-13 | Stop reason: HOSPADM

## 2022-12-13 RX ORDER — SODIUM CHLORIDE 0.9 % (FLUSH) 0.9 %
5-40 SYRINGE (ML) INJECTION AS NEEDED
Status: DISCONTINUED | OUTPATIENT
Start: 2022-12-13 | End: 2022-12-13 | Stop reason: HOSPADM

## 2022-12-13 RX ORDER — SODIUM CHLORIDE 0.9 % (FLUSH) 0.9 %
5-40 SYRINGE (ML) INJECTION EVERY 8 HOURS
Status: DISCONTINUED | OUTPATIENT
Start: 2022-12-13 | End: 2022-12-13 | Stop reason: HOSPADM

## 2022-12-13 RX ORDER — KETOROLAC TROMETHAMINE 10 MG/1
10 TABLET, FILM COATED ORAL
Qty: 20 TABLET | Refills: 1 | Status: SHIPPED | OUTPATIENT
Start: 2022-12-13

## 2022-12-13 RX ORDER — HYDROMORPHONE HYDROCHLORIDE 1 MG/ML
INJECTION, SOLUTION INTRAMUSCULAR; INTRAVENOUS; SUBCUTANEOUS AS NEEDED
Status: DISCONTINUED | OUTPATIENT
Start: 2022-12-13 | End: 2022-12-13 | Stop reason: HOSPADM

## 2022-12-13 RX ADMIN — HYDROMORPHONE HYDROCHLORIDE 1 MG: 1 INJECTION, SOLUTION INTRAMUSCULAR; INTRAVENOUS; SUBCUTANEOUS at 11:55

## 2022-12-13 RX ADMIN — SODIUM CHLORIDE, POTASSIUM CHLORIDE, SODIUM LACTATE AND CALCIUM CHLORIDE: 600; 310; 30; 20 INJECTION, SOLUTION INTRAVENOUS at 11:38

## 2022-12-13 RX ADMIN — ONDANSETRON 4 MG: 4 TABLET, ORALLY DISINTEGRATING ORAL at 13:49

## 2022-12-13 RX ADMIN — PROPOFOL 170 MG: 10 INJECTION, EMULSION INTRAVENOUS at 11:49

## 2022-12-13 RX ADMIN — SODIUM CHLORIDE, POTASSIUM CHLORIDE, SODIUM LACTATE AND CALCIUM CHLORIDE 1000 ML: 600; 310; 30; 20 INJECTION, SOLUTION INTRAVENOUS at 10:26

## 2022-12-13 RX ADMIN — LIDOCAINE HYDROCHLORIDE 80 MG: 20 INJECTION, SOLUTION EPIDURAL; INFILTRATION; INTRACAUDAL; PERINEURAL at 11:49

## 2022-12-13 RX ADMIN — ONDANSETRON 4 MG: 2 INJECTION INTRAMUSCULAR; INTRAVENOUS at 11:58

## 2022-12-13 RX ADMIN — LIDOCAINE HYDROCHLORIDE: 20 INJECTION, SOLUTION EPIDURAL; INFILTRATION; INTRACAUDAL; PERINEURAL at 12:05

## 2022-12-13 RX ADMIN — DEXAMETHASONE SODIUM PHOSPHATE 4 MG: 4 INJECTION, SOLUTION INTRA-ARTICULAR; INTRALESIONAL; INTRAMUSCULAR; INTRAVENOUS; SOFT TISSUE at 11:58

## 2022-12-13 RX ADMIN — KETOROLAC TROMETHAMINE 30 MG: 30 INJECTION, SOLUTION INTRAMUSCULAR at 12:06

## 2022-12-13 RX ADMIN — CEFAZOLIN SODIUM 2 G: 1 INJECTION, POWDER, FOR SOLUTION INTRAMUSCULAR; INTRAVENOUS at 11:49

## 2022-12-13 NOTE — ADDENDUM NOTE
Addendum  created 12/13/22 1442 by Vinita Mosley CRNA    Flowsheet accepted, Intraprocedure Flowsheets edited

## 2022-12-13 NOTE — ANESTHESIA PREPROCEDURE EVALUATION
Relevant Problems   RESPIRATORY SYSTEM   (+) Obstructive sleep apnea of adult   (+) Sleep apnea      NEUROLOGY   (+) Chronic post-traumatic stress disorder (PTSD) after  combat   (+) Depression      CARDIOVASCULAR   (+) Essential (primary) hypertension   (+) Hypertension      ENDOCRINE   (+) Lateral epicondylitis (tennis elbow)       Anesthetic History   No history of anesthetic complications            Review of Systems / Medical History  Patient summary reviewed and pertinent labs reviewed    Pulmonary        Sleep apnea: CPAP           Neuro/Psych       CVA       Cardiovascular    Hypertension                Comments: Normal sinus rhythm   Nonspecific T wave abnormality   GI/Hepatic/Renal  Within defined limits              Endo/Other        Obesity     Other Findings            Past Medical History:   Diagnosis Date    BPH (benign prostatic hyperplasia)     Burning with urination     Depression     Frequent urination     H/O Bell's palsy 2017    Hypercholesterolemia     Hypertension     GENEVA on CPAP     Prostatitis 01/05/2021    PTSD (post-traumatic stress disorder)     Stroke (HCC) Unknown    Takes ASA daily    Tinnitus of right ear        Past Surgical History:   Procedure Laterality Date    HX APPENDECTOMY      HX COLONOSCOPY         Current Outpatient Medications   Medication Instructions    acetaminophen (TYLENOL) 1,000 mg, Oral, As needed    amLODIPine (NORVASC) 2.5 mg, Oral, DAILY    aspirin delayed-release 81 mg, Oral, DAILY    atorvastatin (LIPITOR) 10 mg, Oral, DAILY    buPROPion XL (WELLBUTRIN XL) 150 mg, Oral, EVERY BEDTIME    calcium carbonate/vitamin D3 (CALCIUM 500 + D, D3, PO) 1 Tablet, Oral, DAILY    cholecalciferol (VITAMIN D3) (1000 Units /25 mcg) tablet Oral, DAILY    clobetasoL (TEMOVATE) 0.05 %, Topical    prazosin (MINIPRESS) 5 mg, Oral, EVERY BEDTIME    sildenafil citrate (VIAGRA) 100 mg, Oral, AS NEEDED    telmisartan (MICARDIS) 80 mg, Oral, DAILY    traZODone (DESYREL) 50 mg tablet Oral, EVERY BEDTIME    valACYclovir (VALTREX) 500 mg tablet Oral, 2 TIMES DAILY AS NEEDED       No current facility-administered medications for this encounter. Current Outpatient Medications   Medication Sig    acetaminophen (TYLENOL) 500 mg tablet Take 1,000 mg by mouth. As needed    clobetasoL (TEMOVATE) 0.05 % topical cream Apply 0.05 % to affected area.  calcium carbonate/vitamin D3 (CALCIUM 500 + D, D3, PO) Take 1 Tab by mouth daily.  traZODone (DESYREL) 50 mg tablet Take  by mouth nightly.  prazosin (MINIPRESS) 5 mg capsule Take 5 mg by mouth nightly.  buPROPion XL (WELLBUTRIN XL) 150 mg tablet Take 150 mg by mouth nightly.  amLODIPine (NORVASC) 2.5 mg tablet Take 2.5 mg by mouth daily.  aspirin delayed-release 81 mg tablet Take 81 mg by mouth daily.  atorvastatin (LIPITOR) 10 mg tablet Take 10 mg by mouth daily.  cholecalciferol (VITAMIN D3) (1000 Units /25 mcg) tablet Take  by mouth daily.  telmisartan (MICARDIS) 80 mg tablet Take 80 mg by mouth daily.  valACYclovir (VALTREX) 500 mg tablet Take  by mouth two (2) times daily as needed.  sildenafil citrate (VIAGRA) 100 mg tablet Take 100 mg by mouth as needed for Erectile Dysfunction. No data found.     No results found for: WBC, WBCLT, HGBPOC, HGB, HGBP, HCTPOC, HCT, PHCT, RBCH, PLT, MCV, HGBEXT, HCTEXT, PLTEXT  Lab Results   Component Value Date/Time    Sodium 142 02/24/2021 09:59 AM    Potassium 4.6 02/24/2021 09:59 AM    Chloride 108 02/24/2021 09:59 AM    CO2 32 02/24/2021 09:59 AM    Anion gap 2 (L) 02/24/2021 09:59 AM    Glucose 86 02/24/2021 09:59 AM    BUN 17 02/24/2021 09:59 AM    Creatinine 1.25 02/24/2021 09:59 AM    BUN/Creatinine ratio 14 02/24/2021 09:59 AM    GFR est AA >60 02/24/2021 09:59 AM    GFR est non-AA 60 (L) 02/24/2021 09:59 AM    Calcium 9.5 02/24/2021 09:59 AM     No results found for: APTT, PTP, INR, INREXT  Lab Results   Component Value Date/Time Glucose 86 02/24/2021 09:59 AM       Physical Exam    Airway  Mallampati: II    Neck ROM: normal range of motion        Cardiovascular    Rhythm: regular  Rate: normal         Dental         Pulmonary  Breath sounds clear to auscultation               Abdominal         Other Findings            Anesthetic Plan    ASA: 3  Anesthesia type: general    Monitoring Plan: Continuous noninvasive hemodynamic monitoring      Induction: Intravenous  Anesthetic plan and risks discussed with: Patient

## 2022-12-13 NOTE — OP NOTES
Hydrodistention of bladder  Catheterization of bladder for chemotherapy instillation-rescue solution  Surgeon-Goyo  Anesthesia-General  Complication-without  EBL less than 5 cc  Assistant-nursing  Pre-op diagnosis interstitial cystitis  Postop diagnosis same  Indication-patient has urgency and frequency and signs of interstitial cystitis with pelvic pain. He set for cystoscopy hydrodistention. He is aware the risk of bleeding infection injury to the bladder or urethra ureter vascular injury pulmonary embolus and death he has no questions  Procedure-patient's prepped and draped in usual sterile fashion after undergoing general anesthesia and placed lithotomy position. Patient timeout performed. Patient given preoperative antibiotics. Patient SCDs applied  Patient was then was scoped with a 21 [employer] G[employer] cystoscope. Had normal pendulous bulbous membranous and prostatic urethra for his age. Once the bladder 2+ trabeculated ,fine trabeculations. Bladder was filled to 400 cc would not go any further. When I emptied the bladder he formed red petechiae everywhere in his bladder consistent with interstitial cystitis. There is no Sy's ulcers and nothing of note in his ureters at the openings. I then removed the cystoscope I then catheterize him with a simple catheter and placed in a rescue solution which is bicarb lidocaine and heparin and put a 16 a belladonna opium suppository into his rectum.   He was then taken off the table to recovery area without complication

## 2022-12-13 NOTE — ANESTHESIA POSTPROCEDURE EVALUATION
Procedure(s):  CYSTOURETHROSCOPY AND HYDRODISTENTION OF BLADDER WITH RESCUE SOLUTION.     general    Anesthesia Post Evaluation      Multimodal analgesia: multimodal analgesia used between 6 hours prior to anesthesia start to PACU discharge  Patient location during evaluation: PACU  Patient participation: complete - patient participated  Level of consciousness: awake  Pain score: 0  Pain management: adequate  Airway patency: patent  Anesthetic complications: no  Cardiovascular status: acceptable  Respiratory status: acceptable  Hydration status: acceptable  Post anesthesia nausea and vomiting:  controlled  Final Post Anesthesia Temperature Assessment:  Normothermia (36.0-37.5 degrees C)      INITIAL Post-op Vital signs:   Vitals Value Taken Time   /87 12/13/22 1230   Temp 36.6 °C (97.8 °F) 12/13/22 1230   Pulse 71 12/13/22 1230   Resp 23 12/13/22 1230   SpO2 100 % 12/13/22 1230

## 2022-12-13 NOTE — PERIOP NOTES
Patient alert and oriented x4, VS stable, no complaints of pain at this time. Friend in waiting room. Bed in low position, call bell within reach. Patient states okay to review and give discharge instructions to [facility], friend.

## 2023-01-03 ENCOUNTER — OFFICE VISIT (OUTPATIENT)
Dept: UROLOGY | Age: 58
End: 2023-01-03
Payer: OTHER GOVERNMENT

## 2023-01-03 VITALS
BODY MASS INDEX: 30.82 KG/M2 | WEIGHT: 185 LBS | OXYGEN SATURATION: 99 % | TEMPERATURE: 98.1 F | HEIGHT: 65 IN | SYSTOLIC BLOOD PRESSURE: 144 MMHG | RESPIRATION RATE: 16 BRPM | HEART RATE: 78 BPM | DIASTOLIC BLOOD PRESSURE: 88 MMHG

## 2023-01-03 DIAGNOSIS — N13.8 ENLARGED PROSTATE WITH URINARY OBSTRUCTION: Primary | ICD-10-CM

## 2023-01-03 DIAGNOSIS — N40.1 ENLARGED PROSTATE WITH URINARY OBSTRUCTION: Primary | ICD-10-CM

## 2023-01-03 DIAGNOSIS — N30.10 INTERSTITIAL CYSTITIS: ICD-10-CM

## 2023-01-03 DIAGNOSIS — R35.0 INCREASED URINARY FREQUENCY: ICD-10-CM

## 2023-01-03 DIAGNOSIS — Z80.42 FAMILY HX OF PROSTATE CANCER: ICD-10-CM

## 2023-01-03 DIAGNOSIS — Z80.42 FAMILY HISTORY OF PROSTATE CANCER: ICD-10-CM

## 2023-01-03 DIAGNOSIS — N32.81 OVERACTIVE BLADDER: ICD-10-CM

## 2023-01-03 PROBLEM — N18.9 CHRONIC RENAL IMPAIRMENT: Status: ACTIVE | Noted: 2023-01-03

## 2023-01-03 LAB
BILIRUB UR QL: NEGATIVE
GLUCOSE UR-MCNC: NEGATIVE MG/DL
KETONES P FAST UR STRIP-MCNC: NEGATIVE MG/DL
PH UR STRIP: 7 [PH] (ref 4.6–8)
PROT UR QL STRIP: NEGATIVE
PVR POC: NORMAL CC
SP GR UR STRIP: 1.01 (ref 1–1.03)
UA UROBILINOGEN AMB POC: NORMAL (ref 0.2–1)
URINALYSIS CLARITY POC: CLEAR
URINALYSIS COLOR POC: YELLOW
URINE BLOOD POC: NEGATIVE
URINE LEUKOCYTES POC: NEGATIVE
URINE NITRITES POC: NEGATIVE

## 2023-01-03 PROCEDURE — 51798 US URINE CAPACITY MEASURE: CPT | Performed by: UROLOGY

## 2023-01-03 PROCEDURE — 3079F DIAST BP 80-89 MM HG: CPT | Performed by: UROLOGY

## 2023-01-03 PROCEDURE — 3077F SYST BP >= 140 MM HG: CPT | Performed by: UROLOGY

## 2023-01-03 PROCEDURE — 81003 URINALYSIS AUTO W/O SCOPE: CPT | Performed by: UROLOGY

## 2023-01-03 PROCEDURE — 99214 OFFICE O/P EST MOD 30 MIN: CPT | Performed by: UROLOGY

## 2023-01-03 RX ORDER — VIBEGRON 75 MG/1
TABLET, FILM COATED ORAL
COMMUNITY
Start: 2022-03-17

## 2023-01-03 RX ORDER — OXYBUTYNIN CHLORIDE 10 MG/1
10 TABLET, EXTENDED RELEASE ORAL DAILY
Qty: 30 TABLET | Refills: 2 | Status: SHIPPED | OUTPATIENT
Start: 2023-01-03 | End: 2023-02-02

## 2023-01-03 NOTE — PROGRESS NOTES
Chief Complaint   Patient presents with    Surgical Follow-up    Urgency    UPJ Obstruction       PHQ-9 score is    Negative    Vitals:    01/03/23 0914   BP: (!) 144/88   Pulse: 78   Resp: 16   Temp: 98.1 °F (36.7 °C)   SpO2: 99%   Weight: 185 lb (83.9 kg)   Height: 5' 5\" (1.651 m)        1. \"Have you been to the ER, urgent care clinic since your last visit? Hospitalized since your last visit? \" No    2. \"Have you seen or consulted any other health care providers outside of the [address] [place] [address] since your last visit? \" No     3. For patients aged 39-70: Has the patient had a colonoscopy / FIT/ Cologuard? No      If the patient is female:    4. For patients aged 41-77: Has the patient had a mammogram within the past 2 years? NA - based on age or sex      11. For patients aged 21-65: Has the patient had a pap smear?  NA - based on age or sex

## 2023-02-05 NOTE — PROGRESS NOTES
HISTORY OF PRESENT ILLNESS    Danay Miller is a 59 y.o. male is here for follow up on IC. He reported that even after the procedure he had frequency 10-15 times per day and pelvic pain. Last appointment he was prescribed oxybutynin. Is only been on the ditropan  for about a month. He denies fevers, chills, nausea, vomiting weight loss or bone pain    Today,  He reports having no problems with frequency( still going a lot but not as it was before the surgery) or urgency, nocturia x1 His IPSS is 4. He    History  He is s/p Hydrodistention of bladder  Catheterization of bladder for chemotherapy instillation-rescue solution from 12/13/22     Findings: the bladder had red petechiae everywhere which consistent with interstitial cystitis. Past Medical History:  PMHx (including negatives):  has a past medical history of BPH (benign prostatic hyperplasia), Burning with urination, Depression, Frequent urination, H/O Bell's palsy (2017), Hypercholesterolemia, Hypertension, GENEVA on CPAP, Prostatitis (01/05/2021), PTSD (post-traumatic stress disorder), [facility] (Unknown), and Tinnitus of right ear. PSurgHx:  has a past surgical history that includes hx appendectomy; hx colonoscopy; hx urological (12/13/2022); and hx urological (12/13/2022). PSocHx:  reports that he has never smoked. He has never used smokeless tobacco. He reports that he does not drink alcohol and does not use drugs. Home Medications    Medication Sig Start Date End Date Taking? Authorizing Provider   calcium-cholecalciferol, D3, (CALTRATE 600+D) tablet  1/4/23  Yes Provider, Historical   vibegron Lucetta [place]) 75 mg tablet  3/17/22  Yes Provider, Historical   [facility] blue-sod phos-phsal-hyo (URIBEL) 118-10-40.8-36 mg cap capsule Take 1 Capsule by mouth four (4) times daily. 12/13/22  Yes Pau Aranda MD   acetaminophen (TYLENOL) 500 mg tablet Take 1,000 mg by mouth.  As needed 4/13/22  Yes Provider, Historical   clobetasoL (Jeffery Lathe) 0.05 % topical cream Apply 0.05 % to affected area. 3/23/22 3/23/23 Yes Provider, Historical   traZODone (DESYREL) 50 mg tablet Take  by mouth nightly. Yes Provider, Historical   prazosin (MINIPRESS) 5 mg capsule Take 5 mg by mouth nightly. Yes Provider, Historical   buPROPion XL (WELLBUTRIN XL) 150 mg tablet Take 150 mg by mouth nightly. Yes Provider, Historical   amLODIPine (NORVASC) 2.5 mg tablet Take 2.5 mg by mouth daily. Yes Provider, Historical   aspirin delayed-release 81 mg tablet Take 81 mg by mouth daily. Yes Provider, Historical   atorvastatin (LIPITOR) 10 mg tablet Take 10 mg by mouth daily. Yes Provider, Historical   cholecalciferol (VITAMIN D3) (1000 Units /25 mcg) tablet Take  by mouth daily. Yes Provider, Historical   telmisartan (MICARDIS) 80 mg tablet Take 80 mg by mouth daily. Yes Provider, Historical   valACYclovir (VALTREX) 500 mg tablet Take  by mouth two (2) times daily as needed. Yes Provider, Historical   sildenafil citrate (VIAGRA) 100 mg tablet Take 100 mg by mouth as needed for Erectile Dysfunction. Yes Provider, Historical   ketorolac (TORADOL) 10 mg tablet Take 1 Tablet by mouth every six (6) hours as needed for Pain. Patient not taking: No sig reported 12/13/22   Yovana Mckeon MD   [facility]) 500 mg tablet Take 1 Tablet by mouth daily. Patient not taking: No sig reported 12/13/22   Yovana Mckeon MD        Chronic Conditions Addressed Today       1. Enlarged prostate with urinary obstruction     Overview      PSA[de-identified]  10/2020=0.37  2/2021=0.38            Relevant Medications     calcium-cholecalciferol, D3, (CALTRATE 600+D) tablet    2. Increased urinary frequency     Overview      1/5/22: He has been diagnosed with prostatitis in the past.  He feels that he does not respond to antibiotic therapy. He is most bothered by urinary frequency up to 20 times/day. He was trialed on a short course of Gemtesa and found not to be very helpful.   He may be a candidate for bladder Botox therapy or hydrodistention with rescue solution. 3. Interstitial cystitis     Overview      1/2023-  He reported that even after the procedure he had frequency 10-15 times per day and pelvic pain. Last appointment he was prescribed oxybutynin  He is s/p Hydrodistention of bladder  Catheterization of bladder for chemotherapy instillation-rescue solution from 12/13/22     Findings: the bladder had red petechiae everywhere which consistent with interstitial cystitis. He reported that even after the procedure he had frequency 10-15 times per day and pelvic pain. Last appointment he was prescribed oxybutynin    -1/2022: He is very bothered by lower urinary tract symptoms. He reports no responsiveness to treatment for prostatitis in the past.  He had some relief from [place]. He may be a candidate for bladder Botox therapy or hydrodistention with rescue solution instillation. Relevant Medications     calcium-cholecalciferol, D3, (CALTRATE 600+D) tablet     Other Relevant Orders     AMB POC URINALYSIS DIP STICK AUTO W/O MICRO (Completed)     AMB POC PVR, DANYEL,POST-VOID RES,US,NON-IMAGING (Completed)    4. Overactive bladder - Primary     Overview      1/2022: He is very bothered by lower urinary tract symptoms. He reports no responsiveness to treatment for prostatitis in the past.  He had some relief from [place]. He may be a candidate for bladder Botox therapy or hydrodistention with rescue solution instillation. Relevant Medications     calcium-cholecalciferol, D3, (CALTRATE 600+D) tablet    5. Chronic interstitial cystitis     Relevant Medications     calcium-cholecalciferol, D3, (CALTRATE 600+D) tablet       Review of Systems   Constitutional: Negative. HENT: Negative. Eyes: Negative. Respiratory: Negative. Cardiovascular: Negative. Gastrointestinal: Negative. Genitourinary: Negative. Skin: Negative.     Endo/Heme/Allergies: Negative. Psychiatric/Behavioral: Negative. Patient denies the symptoms of COVID-19 per routine screening guidelines. Physical Exam  Vitals and nursing note reviewed. Constitutional:       Appearance: Normal appearance. HENT:      Head: Normocephalic. Nose: Nose normal.      Mouth/Throat:      Mouth: Mucous membranes are moist.   Eyes:      Pupils: Pupils are equal, round, and reactive to light. Cardiovascular:      Rate and Rhythm: Normal rate and regular rhythm. Pulmonary:      Effort: Pulmonary effort is normal.   Abdominal:      General: Abdomen is flat. Palpations: Abdomen is soft. Genitourinary:     Penis: Normal.       Testes: Normal.   Musculoskeletal:         General: Normal range of motion. Cervical back: Normal range of motion. Skin:     General: Skin is warm. Neurological:      General: No focal deficit present. Mental Status: He is alert and oriented to person, place, and time. Psychiatric:         Mood and Affect: Mood normal.         Behavior: Behavior normal.         Thought Content: Thought content normal.         Judgment: Judgment normal.   ASSESSMENT and PLAN  Diagnoses and all orders for this visit:    1. Overactive bladder    2. Interstitial cystitis  -     AMB POC URINALYSIS DIP STICK AUTO W/O MICRO  -     AMB POC PVR, DANYEL,POST-VOID RES,US,NON-IMAGING    3. Increased urinary frequency    4. Enlarged prostate with urinary obstruction    5. Chronic interstitial cystitis       Keep on Ditropan watches diet for his IC is concerned see me back in 3 months        Lisamassimo Castro may have a reminder for a \"due or due soon\" health maintenance. The patient has been encouraged to contact their primary care provider for follow-up on this health maintenance or other necessary and/or routine health screening.      Rose Jalloh MD

## 2023-02-06 ENCOUNTER — OFFICE VISIT (OUTPATIENT)
Dept: UROLOGY | Age: 58
End: 2023-02-06
Payer: OTHER GOVERNMENT

## 2023-02-06 VITALS
DIASTOLIC BLOOD PRESSURE: 88 MMHG | TEMPERATURE: 98.3 F | HEIGHT: 65 IN | WEIGHT: 185 LBS | BODY MASS INDEX: 30.82 KG/M2 | OXYGEN SATURATION: 99 % | RESPIRATION RATE: 16 BRPM | SYSTOLIC BLOOD PRESSURE: 144 MMHG | HEART RATE: 63 BPM

## 2023-02-06 DIAGNOSIS — R35.0 INCREASED URINARY FREQUENCY: ICD-10-CM

## 2023-02-06 DIAGNOSIS — N32.81 OVERACTIVE BLADDER: Primary | ICD-10-CM

## 2023-02-06 DIAGNOSIS — N40.1 ENLARGED PROSTATE WITH URINARY OBSTRUCTION: ICD-10-CM

## 2023-02-06 DIAGNOSIS — N13.8 ENLARGED PROSTATE WITH URINARY OBSTRUCTION: ICD-10-CM

## 2023-02-06 DIAGNOSIS — N30.10 INTERSTITIAL CYSTITIS: ICD-10-CM

## 2023-02-06 DIAGNOSIS — N30.10 CHRONIC INTERSTITIAL CYSTITIS: ICD-10-CM

## 2023-02-06 LAB
BILIRUB UR QL: NEGATIVE
GLUCOSE UR-MCNC: NEGATIVE MG/DL
KETONES P FAST UR STRIP-MCNC: NEGATIVE MG/DL
PH UR STRIP: 6.5 [PH] (ref 4.6–8)
PROT UR QL STRIP: NEGATIVE
PVR POC: NORMAL CC
SP GR UR STRIP: 1.02 (ref 1–1.03)
UA UROBILINOGEN AMB POC: NORMAL (ref 0.2–1)
URINALYSIS CLARITY POC: CLEAR
URINALYSIS COLOR POC: YELLOW
URINE BLOOD POC: NEGATIVE
URINE LEUKOCYTES POC: NEGATIVE
URINE NITRITES POC: NEGATIVE

## 2023-02-06 PROCEDURE — 81003 URINALYSIS AUTO W/O SCOPE: CPT | Performed by: UROLOGY

## 2023-02-06 PROCEDURE — 99214 OFFICE O/P EST MOD 30 MIN: CPT | Performed by: UROLOGY

## 2023-02-06 PROCEDURE — 51798 US URINE CAPACITY MEASURE: CPT | Performed by: UROLOGY

## 2023-02-06 RX ORDER — MULTIVITAMIN
TABLET ORAL
COMMUNITY
Start: 2023-01-04

## 2023-02-06 NOTE — PROGRESS NOTES
Chief Complaint   Patient presents with    Follow Up Chronic Condition    Benign Prostatic Hypertrophy    UPJ Obstruction    Prostate Cancer    Bladder Infection       PHQ-9 score is    Negative    Vitals:    02/06/23 1055   BP: (!) 144/88   Pulse: 63   Resp: 16   Temp: 98.3 °F (36.8 °C)   SpO2: 99%   Weight: 185 lb (83.9 kg)   Height: 5' 5\" (1.651 m)        1. \"Have you been to the ER, urgent care clinic since your last visit? Hospitalized since your last visit? \" No    2. \"Have you seen or consulted any other health care providers outside of the [address] [place] [address] since your last visit? \" No     3. For patients aged 39-70: Has the patient had a colonoscopy / FIT/ Cologuard? Yes - no Care Gap present      If the patient is female:    4. For patients aged 41-77: Has the patient had a mammogram within the past 2 years? NA - based on age or sex      11. For patients aged 21-65: Has the patient had a pap smear?  NA - based on age or sex

## 2023-04-03 DIAGNOSIS — R35.0 INCREASED URINARY FREQUENCY: ICD-10-CM

## 2023-04-03 DIAGNOSIS — N40.1 ENLARGED PROSTATE WITH URINARY OBSTRUCTION: ICD-10-CM

## 2023-04-03 DIAGNOSIS — N13.8 ENLARGED PROSTATE WITH URINARY OBSTRUCTION: ICD-10-CM

## 2023-04-03 DIAGNOSIS — Z80.42 FAMILY HISTORY OF PROSTATE CANCER: ICD-10-CM

## 2023-04-25 RX ORDER — CALCIUM CARBONATE/VITAMIN D3 600 MG-10
TABLET ORAL
COMMUNITY
Start: 2023-04-18

## 2023-04-25 RX ORDER — AMLODIPINE BESYLATE 2.5 MG/1
TABLET ORAL
COMMUNITY
Start: 2023-02-06

## 2023-04-25 RX ORDER — ASPIRIN 81 MG/1
TABLET, COATED ORAL
COMMUNITY
Start: 2023-03-22

## 2023-04-25 RX ORDER — MELATONIN
COMMUNITY
Start: 2023-03-22

## 2023-04-25 RX ORDER — TELMISARTAN 80 MG/1
TABLET ORAL
COMMUNITY
Start: 2023-02-06

## 2023-04-25 RX ORDER — OXYBUTYNIN CHLORIDE 10 MG/1
10 TABLET, EXTENDED RELEASE ORAL DAILY
COMMUNITY
Start: 2023-03-03

## 2023-04-25 RX ORDER — ATORVASTATIN CALCIUM 10 MG/1
TABLET, FILM COATED ORAL
COMMUNITY
Start: 2023-02-06

## 2023-04-26 ENCOUNTER — TELEPHONE (OUTPATIENT)
Dept: UROLOGY | Age: 58
End: 2023-04-26

## 2023-04-26 RX ORDER — OXYBUTYNIN CHLORIDE 10 MG/1
10 TABLET, EXTENDED RELEASE ORAL DAILY
Qty: 90 TABLET | Refills: 3 | Status: SHIPPED | OUTPATIENT
Start: 2023-04-26

## 2023-05-07 PROBLEM — N30.10 CHRONIC INTERSTITIAL CYSTITIS: Status: ACTIVE | Noted: 2022-12-13

## 2023-05-08 ENCOUNTER — OFFICE VISIT (OUTPATIENT)
Age: 58
End: 2023-05-08
Payer: OTHER GOVERNMENT

## 2023-05-08 VITALS
SYSTOLIC BLOOD PRESSURE: 143 MMHG | WEIGHT: 179 LBS | TEMPERATURE: 97.8 F | OXYGEN SATURATION: 100 % | BODY MASS INDEX: 29.82 KG/M2 | HEIGHT: 65 IN | HEART RATE: 41 BPM | DIASTOLIC BLOOD PRESSURE: 91 MMHG

## 2023-05-08 DIAGNOSIS — Z80.42 FAMILY HX OF PROSTATE CANCER: ICD-10-CM

## 2023-05-08 DIAGNOSIS — N30.10 CHRONIC INTERSTITIAL CYSTITIS: ICD-10-CM

## 2023-05-08 DIAGNOSIS — R35.0 INCREASED URINARY FREQUENCY: Primary | ICD-10-CM

## 2023-05-08 DIAGNOSIS — N41.1 CHRONIC PROSTATITIS: ICD-10-CM

## 2023-05-08 LAB
BILIRUBIN, URINE, POC: NEGATIVE
BLOOD URINE, POC: NEGATIVE
GLUCOSE URINE, POC: NEGATIVE
KETONES, URINE, POC: NEGATIVE
LEUKOCYTE ESTERASE, URINE, POC: NEGATIVE
NITRITE, URINE, POC: NEGATIVE
PH, URINE, POC: 7.5 (ref 4.6–8)
PROTEIN,URINE, POC: NEGATIVE
SPECIFIC GRAVITY, URINE, POC: 1.02 (ref 1–1.03)
URINALYSIS CLARITY, POC: CLEAR
URINALYSIS COLOR, POC: YELLOW
UROBILINOGEN, POC: NORMAL

## 2023-05-08 PROCEDURE — 3080F DIAST BP >= 90 MM HG: CPT | Performed by: UROLOGY

## 2023-05-08 PROCEDURE — 81003 URINALYSIS AUTO W/O SCOPE: CPT | Performed by: UROLOGY

## 2023-05-08 PROCEDURE — 3077F SYST BP >= 140 MM HG: CPT | Performed by: UROLOGY

## 2023-05-08 PROCEDURE — 99214 OFFICE O/P EST MOD 30 MIN: CPT | Performed by: UROLOGY

## 2023-05-08 PROCEDURE — 51798 US URINE CAPACITY MEASURE: CPT | Performed by: UROLOGY

## 2023-05-08 NOTE — PROGRESS NOTES
Chief Complaint   Patient presents with    Follow-up    Other     Overactive bladder     Urinary Frequency    Cystitis     1. Have you been to the ER, urgent care clinic since your last visit? Hospitalized since your last visit? No    2. Have you seen or consulted any other health care providers outside of the [address] [place] [address] since your last visit? Include any pap smears or colon screening.  No  BP (!) 143/91 (Site: Left Upper Arm, Position: Sitting, Cuff Size: Large Adult)   Pulse (!) 41   Temp 97.8 °F (36.6 °C) (Temporal)   Ht 5' 5\" (1.651 m)   Wt 179 lb (81.2 kg)   SpO2 100%   BMI 29.79 kg/m²

## 2023-05-24 RX ORDER — TRAZODONE HYDROCHLORIDE 50 MG/1
TABLET ORAL
COMMUNITY

## 2023-05-24 RX ORDER — KETOROLAC TROMETHAMINE 10 MG/1
10 TABLET, FILM COATED ORAL EVERY 6 HOURS PRN
COMMUNITY
Start: 2022-12-13

## 2023-05-24 RX ORDER — VIBEGRON 75 MG/1
TABLET, FILM COATED ORAL
COMMUNITY
Start: 2022-03-17

## 2023-05-24 RX ORDER — VALACYCLOVIR HYDROCHLORIDE 500 MG/1
TABLET, FILM COATED ORAL 2 TIMES DAILY PRN
COMMUNITY

## 2023-05-24 RX ORDER — ACETAMINOPHEN 500 MG
1000 TABLET ORAL
COMMUNITY
Start: 2022-04-13

## 2023-05-24 RX ORDER — METHENAMINE, SODIUM PHOSPHATE, MONOBASIC, MONOHYDRATE, PHENYL SALICYLATE, METHYLENE BLUE, AND HYOSCYAMINE SULFATE 118; 40.8; 36; 10; .12 MG/1; MG/1; MG/1; MG/1; MG/1
1 CAPSULE ORAL 4 TIMES DAILY
COMMUNITY
Start: 2022-12-13

## 2023-05-24 RX ORDER — PRAZOSIN HYDROCHLORIDE 5 MG/1
5 CAPSULE ORAL NIGHTLY
COMMUNITY

## 2023-05-24 RX ORDER — BUPROPION HYDROCHLORIDE 150 MG/1
150 TABLET ORAL
COMMUNITY

## 2023-05-24 RX ORDER — LEVOFLOXACIN 500 MG/1
500 TABLET, FILM COATED ORAL DAILY
COMMUNITY
Start: 2022-12-13

## 2023-05-24 RX ORDER — SILDENAFIL 100 MG/1
100 TABLET, FILM COATED ORAL PRN
COMMUNITY

## 2023-07-02 RX ORDER — OXYBUTYNIN CHLORIDE 10 MG/1
TABLET, EXTENDED RELEASE ORAL
Qty: 30 TABLET | Refills: 2 | OUTPATIENT
Start: 2023-07-02

## 2023-11-07 NOTE — PROGRESS NOTES
HISTORY OF PRESENT ILLNESS    Jose Barrett is a 59 y.o. male. Today, he reports even after the procedure he has frequency every 10-15 min, nocturia x 1. Reports bland diet  IPSS is 4 and it was 6 before the procedure    PVR is 33 cc, urine is clean. He has a  history of urgency and frequency and signs of interstitial cystitis with pelvic pain. He is s/p Hydrodistention of bladder  Catheterization of bladder for chemotherapy instillation-rescue solution from 12/13/22    Findings: the bladder had red petechiae everywhere which consistent with interstitial cystitis. There was no Hunner's ulcers and nothing of note in his ureters at the openings  I talked about interstitial cystitis. He and I talked about alkalinization of the urine. He and I talked about taking Ditropan XL in the morning with the risk of constipation. We talked about using Prelief before every meal.       Past Medical History:  PMHx (including negatives):  has a past medical history of BPH (benign prostatic hyperplasia), Burning with urination, Depression, Frequent urination, H/O Bell's palsy (2017), Hypercholesterolemia, Hypertension, GENEVA on CPAP, Prostatitis (01/05/2021), PTSD (post-traumatic stress disorder), [facility] (Unknown), and Tinnitus of right ear. PSurgHx:  has a past surgical history that includes hx appendectomy and hx colonoscopy. PSocHx:  reports that he has never smoked. He has never used smokeless tobacco. He reports that he does not drink alcohol and does not use drugs. Home Medications    Medication Sig Start Date End Date Taking? Authorizing Provider   vibegron [place]) 75 mg tablet  3/17/22  Yes Provider, Historical   oxybutynin chloride XL (DITROPAN XL) 10 mg CR tablet Take 1 Tablet by mouth daily for 30 days. 1/3/23 2/2/23 Yes Katharine Bush, NP   [facility] blue-sod phos-phsal-hyo (URIBEL) 118-10-40.8-36 mg cap capsule Take 1 Capsule by mouth four (4) times daily.  12/13/22  Yes Carter Noyola, MD   acetaminophen (TYLENOL) 500 mg tablet Take 1,000 mg by mouth. As needed 4/13/22  Yes Provider, Historical   clobetasoL (TEMOVATE) 0.05 % topical cream Apply 0.05 % to affected area. 3/23/22 3/23/23 Yes Provider, Historical   calcium carbonate/vitamin D3 (CALCIUM 500 + D, D3, PO) Take 1 Tab by mouth daily. Yes Provider, Historical   traZODone (DESYREL) 50 mg tablet Take  by mouth nightly. Yes Provider, Historical   prazosin (MINIPRESS) 5 mg capsule Take 5 mg by mouth nightly. Yes Provider, Historical   buPROPion XL (WELLBUTRIN XL) 150 mg tablet Take 150 mg by mouth nightly. Yes Provider, Historical   amLODIPine (NORVASC) 2.5 mg tablet Take 2.5 mg by mouth daily. Yes Provider, Historical   aspirin delayed-release 81 mg tablet Take 81 mg by mouth daily. Yes Provider, Historical   atorvastatin (LIPITOR) 10 mg tablet Take 10 mg by mouth daily. Yes Provider, Historical   cholecalciferol (VITAMIN D3) (1000 Units /25 mcg) tablet Take  by mouth daily. Yes Provider, Historical   telmisartan (MICARDIS) 80 mg tablet Take 80 mg by mouth daily. Yes Provider, Historical   valACYclovir (VALTREX) 500 mg tablet Take  by mouth two (2) times daily as needed. Yes Provider, Historical   sildenafil citrate (VIAGRA) 100 mg tablet Take 100 mg by mouth as needed for Erectile Dysfunction. Yes Provider, Historical   ketorolac (TORADOL) 10 mg tablet Take 1 Tablet by mouth every six (6) hours as needed for Pain. Patient not taking: Reported on 1/3/2023 12/13/22   Nelia Fisher MD   levoFLOXacin [facility]) 500 mg tablet Take 1 Tablet by mouth daily. Patient not taking: Reported on 1/3/2023 12/13/22   Nelia Fisher MD        Chronic Conditions Addressed Today       1. Family hx of prostate cancer     Relevant Medications     vibegron (Gemtesa) 75 mg tablet     oxybutynin chloride XL (DITROPAN XL) 10 mg CR tablet    2.  Enlarged prostate with urinary obstruction - Primary     Overview PSA[de-identified]  10/2020=0.37  2/2021=0.38            Relevant Medications     vibegron (Gemtesa) 75 mg tablet     oxybutynin chloride XL (DITROPAN XL) 10 mg CR tablet     Other Relevant Orders     AMB POC PVR, DANYEL,POST-VOID RES,US,NON-IMAGING (Completed)     AMB POC URINALYSIS DIP STICK AUTO W/O MICRO (Completed)    3. Increased urinary frequency     Overview      1/5/22: He has been diagnosed with prostatitis in the past.  He feels that he does not respond to antibiotic therapy. He is most bothered by urinary frequency up to 20 times/day. He was trialed on a short course of Gemtesa and found not to be very helpful. He may be a candidate for bladder Botox therapy or hydrodistention with rescue solution. Relevant Medications     vibegron (Gemtesa) 75 mg tablet     oxybutynin chloride XL (DITROPAN XL) 10 mg CR tablet     Other Relevant Orders     AMB POC PVR, DANYEL,POST-VOID RES,US,NON-IMAGING (Completed)     AMB POC URINALYSIS DIP STICK AUTO W/O MICRO (Completed)    4. Interstitial cystitis     Overview      1/2022: He is very bothered by lower urinary tract symptoms. He reports no responsiveness to treatment for prostatitis in the past.  He had some relief from [place]. He may be a candidate for bladder Botox therapy or hydrodistention with rescue solution instillation. 5. Overactive bladder     Overview      1/2022: He is very bothered by lower urinary tract symptoms. He reports no responsiveness to treatment for prostatitis in the past.  He had some relief from [place]. He may be a candidate for bladder Botox therapy or hydrodistention with rescue solution instillation. Review of Systems   Constitutional: Negative. HENT: Negative. Eyes: Negative. Respiratory: Negative. Cardiovascular: Negative. Genitourinary:  Positive for frequency. Musculoskeletal: Negative. Skin: Negative. Neurological:  Positive for dizziness. Endo/Heme/Allergies: Negative. Psychiatric/Behavioral: Negative. Patient denies the symptoms of COVID-19 per routine screening guidelines. Physical Exam  Vitals and nursing note reviewed. Constitutional:       Appearance: Normal appearance. HENT:      Head: Normocephalic. Nose: Nose normal.      Mouth/Throat:      Mouth: Mucous membranes are moist.   Eyes:      Pupils: Pupils are equal, round, and reactive to light. Cardiovascular:      Rate and Rhythm: Normal rate and regular rhythm. Pulmonary:      Effort: Pulmonary effort is normal.   Abdominal:      General: Abdomen is flat. Palpations: Abdomen is soft. Genitourinary:     Penis: Normal.       Testes: Normal.   Good rectal tone small benign feeling prostate  Musculoskeletal:         General: Normal range of motion. Cervical back: Normal range of motion. Skin:     General: Skin is warm. Neurological:      General: No focal deficit present. Mental Status: He is alert and oriented to person, place, and time. Psychiatric:         Mood and Affect: Mood normal.         Behavior: Behavior normal.         Thought Content: Thought content normal.         Judgment: Judgment normal.   ASSESSMENT and PLAN  Diagnoses and all orders for this visit:    1. Enlarged prostate with urinary obstruction  -     AMB POC PVR, DANYEL,POST-VOID RES,US,NON-IMAGING  -     AMB POC URINALYSIS DIP STICK AUTO W/O MICRO  -     oxybutynin chloride XL (DITROPAN XL) 10 mg CR tablet; Take 1 Tablet by mouth daily for 30 days. 2. Family history of prostate cancer  -     AMB POC PVR, DANYEL,POST-VOID RES,US,NON-IMAGING  -     AMB POC URINALYSIS DIP STICK AUTO W/O MICRO  -     oxybutynin chloride XL (DITROPAN XL) 10 mg CR tablet; Take 1 Tablet by mouth daily for 30 days. 3. Increased urinary frequency  -     AMB POC PVR, DANYEL,POST-VOID RES,US,NON-IMAGING  -     AMB POC URINALYSIS DIP STICK AUTO W/O MICRO  -     oxybutynin chloride XL (DITROPAN XL) 10 mg CR tablet;  Take 1 Tablet by mouth daily for 30 days. 4. Interstitial cystitis    5. Family hx of prostate cancer    6. Overactive bladder       Patient has had had a recent PSA which is very low        Deborapablo Lacy may have a reminder for a \"due or due soon\" health maintenance. The patient has been encouraged to contact their primary care provider for follow-up on this health maintenance or other necessary and/or routine health screening. PAST MEDICAL HISTORY:  H/O Hashimoto thyroiditis     Hemochromatosis     Hypothyroidism     Viral meningitis

## 2024-04-17 RX ORDER — OXYBUTYNIN CHLORIDE 10 MG/1
10 TABLET, EXTENDED RELEASE ORAL DAILY
Qty: 90 TABLET | OUTPATIENT
Start: 2024-04-17

## 2024-05-31 ENCOUNTER — TELEPHONE (OUTPATIENT)
Age: 59
End: 2024-05-31

## 2024-05-31 RX ORDER — OXYBUTYNIN CHLORIDE 10 MG/1
10 TABLET, EXTENDED RELEASE ORAL DAILY
Qty: 30 TABLET | Refills: 2 | Status: SHIPPED | OUTPATIENT
Start: 2024-05-31

## 2024-05-31 NOTE — TELEPHONE ENCOUNTER
Pt called wanting to know if he can have a refill on oxybutynin (DITROPAN-XL) 10 MG extended release tablet  . It looks like he hasn't been seen since 8/9/23, would you like for me to schedule him an appt first ?

## 2024-08-26 ENCOUNTER — TELEPHONE (OUTPATIENT)
Age: 59
End: 2024-08-26

## 2024-08-26 RX ORDER — OXYBUTYNIN CHLORIDE 10 MG/1
10 TABLET, EXTENDED RELEASE ORAL DAILY
Qty: 30 TABLET | Refills: 2 | Status: SHIPPED | OUTPATIENT
Start: 2024-08-26

## 2024-08-26 NOTE — TELEPHONE ENCOUNTER
Pt called stating he do not have anymore refills for oxyBUTYnin (DITROPAN XL) 10 MG extended release tablet ansd would like to know if you can send a new Rx

## 2024-10-14 PROBLEM — R39.9 LOWER URINARY TRACT SYMPTOMS (LUTS): Status: ACTIVE | Noted: 2024-10-14

## 2024-10-16 ENCOUNTER — OFFICE VISIT (OUTPATIENT)
Age: 59
End: 2024-10-16
Payer: OTHER GOVERNMENT

## 2024-10-16 VITALS — DIASTOLIC BLOOD PRESSURE: 74 MMHG | HEART RATE: 74 BPM | SYSTOLIC BLOOD PRESSURE: 145 MMHG

## 2024-10-16 DIAGNOSIS — R39.9 LOWER URINARY TRACT SYMPTOMS (LUTS): ICD-10-CM

## 2024-10-16 DIAGNOSIS — N13.8 ENLARGED PROSTATE WITH URINARY OBSTRUCTION: ICD-10-CM

## 2024-10-16 DIAGNOSIS — N30.10 INTERSTITIAL CYSTITIS: ICD-10-CM

## 2024-10-16 DIAGNOSIS — N30.10 CHRONIC INTERSTITIAL CYSTITIS: Primary | ICD-10-CM

## 2024-10-16 DIAGNOSIS — N40.1 ENLARGED PROSTATE WITH URINARY OBSTRUCTION: ICD-10-CM

## 2024-10-16 LAB
BILIRUBIN, URINE, POC: NEGATIVE
BLOOD URINE, POC: NEGATIVE
GLUCOSE URINE, POC: NEGATIVE
KETONES, URINE, POC: NEGATIVE
LEUKOCYTE ESTERASE, URINE, POC: NEGATIVE
NITRITE, URINE, POC: NEGATIVE
PH, URINE, POC: 7 (ref 4.6–8)
PROTEIN,URINE, POC: NEGATIVE
SPECIFIC GRAVITY, URINE, POC: 1.02 (ref 1–1.03)
URINALYSIS CLARITY, POC: CLEAR
URINALYSIS COLOR, POC: YELLOW
UROBILINOGEN, POC: NORMAL

## 2024-10-16 PROCEDURE — 81003 URINALYSIS AUTO W/O SCOPE: CPT | Performed by: UROLOGY

## 2024-10-16 PROCEDURE — 99214 OFFICE O/P EST MOD 30 MIN: CPT | Performed by: UROLOGY

## 2024-10-16 PROCEDURE — 3077F SYST BP >= 140 MM HG: CPT | Performed by: UROLOGY

## 2024-10-16 PROCEDURE — 3078F DIAST BP <80 MM HG: CPT | Performed by: UROLOGY

## 2024-10-16 NOTE — PROGRESS NOTES
Chief Complaint   Patient presents with    Other        BP (!) 145/74   Pulse 74      PHQ-9 score is    Negative      1. \"Have you been to the ER, urgent care clinic since your last visit?  Hospitalized since your last visit?\" No    2. \"Have you seen or consulted any other health care providers outside of the [facility] since your last visit?\" No     3. For patients aged 45-75: Has the patient had a colonoscopy / FIT/ Cologuard? Yes - no Care Gap present      If the patient is female:    4. For patients aged 40-74: Has the patient had a mammogram within the past 2 years? NA - based on age or sex      5. For patients aged 21-65: Has the patient had a pap smear? NA - based on age or sex

## 2024-10-16 NOTE — PROGRESS NOTES
HISTORY OF PRESENT ILLNESS  Zachariah Wilson is a 58 y.o. male   Patient is going through a flare of interstitial cystitis.  He is already voided 10 times this morning.  We went over diet today.  We talked about things that may trigger his IC.  We talk about stress triggers IC.  He has not been using spicy foods tomatoes strawberries or orange juice he says.  We talked about trying Prelief.  We talked about using Prelief.  We also talked about Uribel., methenamine, Atarax and Zyrtec.  We decided to start with Prelief and Zyrtec and if this does not work I will send in a prescription of Uribel or methenamine depending on his insurance.  Finally if none of this works we will set him up for cystoscopy hydrodistention with rescue solution.  He has no questions  1. Chronic interstitial cystitis  Overview:  History  He is s/p Hydrodistention of bladder  Catheterization of bladder for chemotherapy instillation-rescue solution from 12/13/22     Findings: the bladder had red petechiae everywhere which consistent with interstitial cystitis.       Orders:  -     AMB POC URINALYSIS DIP STICK AUTO W/O MICRO  2. Enlarged prostate with urinary obstruction  Overview:  PSA::  10/2020=0.37  2/2021=0.38      Orders:  -     AMB POC URINALYSIS DIP STICK AUTO W/O MICRO  3. Interstitial cystitis  Overview:  1/2023-  He reported that even after the procedure he had frequency 10-15   times per day and pelvic pain. Last appointment he was prescribed   oxybutynin  He is s/p Hydrodistention of bladder  Catheterization of bladder for chemotherapy instillation-rescue solution   from 12/13/22  Findings: the bladder had red petechiae everywhere which consistent with   interstitial cystitis.     He reported that even after the procedure he had frequency 10-15 times   per day and pelvic pain. Last appointment he was prescribed oxybutynin  -1/2022: He is very bothered by lower urinary tract symptoms.  He reports   no responsiveness to treatment for

## 2025-03-20 ENCOUNTER — OFFICE VISIT (OUTPATIENT)
Age: 60
End: 2025-03-20
Payer: OTHER GOVERNMENT

## 2025-03-20 VITALS
RESPIRATION RATE: 18 BRPM | HEIGHT: 65 IN | OXYGEN SATURATION: 93 % | BODY MASS INDEX: 29.82 KG/M2 | HEART RATE: 69 BPM | SYSTOLIC BLOOD PRESSURE: 148 MMHG | DIASTOLIC BLOOD PRESSURE: 92 MMHG | WEIGHT: 179 LBS

## 2025-03-20 DIAGNOSIS — N40.1 ENLARGED PROSTATE WITH URINARY OBSTRUCTION: ICD-10-CM

## 2025-03-20 DIAGNOSIS — N30.10 INTERSTITIAL CYSTITIS: Primary | ICD-10-CM

## 2025-03-20 DIAGNOSIS — R35.0 INCREASED URINARY FREQUENCY: ICD-10-CM

## 2025-03-20 DIAGNOSIS — R39.9 LOWER URINARY TRACT SYMPTOMS (LUTS): ICD-10-CM

## 2025-03-20 DIAGNOSIS — N13.8 ENLARGED PROSTATE WITH URINARY OBSTRUCTION: ICD-10-CM

## 2025-03-20 DIAGNOSIS — Z80.42 FAMILY HX OF PROSTATE CANCER: ICD-10-CM

## 2025-03-20 LAB
BILIRUBIN, URINE, POC: NEGATIVE
BLOOD URINE, POC: NEGATIVE
GLUCOSE URINE, POC: NEGATIVE
KETONES, URINE, POC: NEGATIVE
LEUKOCYTE ESTERASE, URINE, POC: NEGATIVE
NITRITE, URINE, POC: NEGATIVE
PH, URINE, POC: 8.5 (ref 4.6–8)
PROTEIN,URINE, POC: NEGATIVE
SPECIFIC GRAVITY, URINE, POC: 1.02 (ref 1–1.03)
URINALYSIS CLARITY, POC: CLEAR
URINALYSIS COLOR, POC: YELLOW
UROBILINOGEN, POC: NORMAL

## 2025-03-20 PROCEDURE — 99214 OFFICE O/P EST MOD 30 MIN: CPT | Performed by: UROLOGY

## 2025-03-20 PROCEDURE — 3080F DIAST BP >= 90 MM HG: CPT | Performed by: UROLOGY

## 2025-03-20 PROCEDURE — 3077F SYST BP >= 140 MM HG: CPT | Performed by: UROLOGY

## 2025-03-20 PROCEDURE — 81003 URINALYSIS AUTO W/O SCOPE: CPT | Performed by: UROLOGY

## 2025-03-20 NOTE — PROGRESS NOTES
1. Have you been to the ER, urgent care clinic since your last visit?  Hospitalized since your last visit? Yes [place]- 2/1/2025 for the flu    2. Have you seen or consulted any other health care providers outside of the [facility] since your last visit?  Include any pap smears or colon screening. no  Vitals:    03/20/25 1533   BP: (!) 148/92   Pulse: 69   Resp: 18   SpO2: 93%

## 2025-03-20 NOTE — PROGRESS NOTES
HISTORY OF PRESENT ILLNESS  Zachariah Wilson is a 59 y.o. male   Patient says he thinks he will better on Gemtesa's last time he was here he denies fevers chills flank pain nausea and vomiting.  He thinks the frequency is better patient family history of prostate cancer need to follow-up with PSA and see me back in 6 months    1. Interstitial cystitis  Overview:  1/2023-  He reported that even after the procedure he had frequency 10-15   times per day and pelvic pain. Last appointment he was prescribed   oxybutynin  He is s/p Hydrodistention of bladder  Catheterization of bladder for chemotherapy instillation-rescue solution   from 12/13/22  Findings: the bladder had red petechiae everywhere which consistent with   interstitial cystitis.     He reported that even after the procedure he had frequency 10-15 times   per day and pelvic pain. Last appointment he was prescribed oxybutynin  -1/2022: He is very bothered by lower urinary tract symptoms.  He reports   no responsiveness to treatment for prostatitis in the past.  He had some   relief from Gemtesa.  He may be a candidate for bladder Botox therapy or hydrodistention with   rescue solution instillation.    10/2024 appointment   Tx  with Prelief and Zyrtec and if this does not work I will send in a prescription of Uribel or methenamine depending on his insurance.   -Finally if none of this works we will set him up for cystoscopy hydrodistention with rescue solution.     Orders:  -     AMB POC URINALYSIS DIP STICK AUTO W/O MICRO  -     vibegron (GEMTESA) 75 MG TABS tablet; Take 1 tablet by mouth daily ceived the following from [facility]: Outside name: vibegron (Gemtesa) 75 mg tablet, Disp-30 tablet, R-5Normal  2. Lower urinary tract symptoms (LUTS)  Overview:  5/2023 frequency and urgency,pelvic pain controled with Oxybutynin.  Orders:  -     vibegron (GEMTESA) 75 MG TABS tablet; Take 1 tablet by mouth daily ceived the following from [facility]

## 2025-03-31 RX ORDER — VIBEGRON 75 MG/1
75 TABLET, FILM COATED ORAL DAILY
Qty: 30 TABLET | Refills: 5 | Status: SHIPPED | OUTPATIENT
Start: 2025-03-31

## 2025-04-06 NOTE — LETTER
1/5/2021 Patient: Quirino Wyatt YOB: 1965 Date of Visit: 1/5/2021 Nan Causey MD 
[address] [address] [phone] [facility] [address] Via Fax: [phone] Dear Nan Causey MD, Thank you for referring Mr. Quirino Wyatt to [facility] for evaluation. My notes for this consultation are attached. If you have questions, please do not hesitate to call me. I look forward to following your patient along with you. Sincerely, Km Garcia MD 
 
 Patient refuses to verify name and . Patient advised writer returning her recent call to discuss a symptom, continues to refuse to verify name and .  States she has already discussed her symptoms with her doctor.      Patient stated thank you for calling and hung up.         Reason for Disposition   Caller has cancelled the call before the first contact    Protocols used: No Contact or Duplicate Contact Call-A-AH

## (undated) DEVICE — CYSTO PACK: Brand: MEDLINE INDUSTRIES, INC.

## (undated) DEVICE — Device: Brand: INJETAK ADJUSTABLE TIP NEEDLE 35CM

## (undated) DEVICE — GARMENT,MEDLINE,DVT,INT,CALF,MED, GEN2: Brand: MEDLINE

## (undated) DEVICE — VINYL ACETATE UROLOGICAL DRAINAGE BAG FOR GE/OEC SYSTEMS W/ 8MM PLUG - NON-STERILE: Brand: CUSTOM MEDICAL SPECIALTIES, INC.

## (undated) DEVICE — SOLUTION SCRB 4OZ 4% CHG H2O AIDED FOR PREOPERATIVE SKIN

## (undated) DEVICE — GLOVE ORANGE PI 7 1/2   MSG9075

## (undated) DEVICE — PREP PAD BNS: Brand: CONVERTORS

## (undated) DEVICE — CATHETER URETH 18FR BLLN 5CC SIL ALLY W/ SIL HYDRGEL 2 W F

## (undated) DEVICE — SOLUTION IRRIG 3000ML 0.9% SOD CHL USP UROMATIC PLAS CONT

## (undated) DEVICE — SOLUTION IRRIG 500ML STRL H2O NONPYROGENIC

## (undated) DEVICE — TUBING, SUCTION, 1/4" X 12', STRAIGHT: Brand: MEDLINE